# Patient Record
Sex: FEMALE | Race: WHITE | NOT HISPANIC OR LATINO | Employment: FULL TIME | URBAN - METROPOLITAN AREA
[De-identification: names, ages, dates, MRNs, and addresses within clinical notes are randomized per-mention and may not be internally consistent; named-entity substitution may affect disease eponyms.]

---

## 2023-01-25 PROBLEM — G47.33 OSA (OBSTRUCTIVE SLEEP APNEA): Status: ACTIVE | Noted: 2023-01-25

## 2023-01-25 PROBLEM — R14.0 GASSINESS: Status: ACTIVE | Noted: 2023-01-25

## 2023-01-25 PROBLEM — E03.8 SUBCLINICAL HYPOTHYROIDISM: Status: ACTIVE | Noted: 2023-01-25

## 2023-01-25 PROBLEM — K58.0 IRRITABLE BOWEL SYNDROME WITH DIARRHEA: Status: ACTIVE | Noted: 2023-01-25

## 2023-01-25 PROBLEM — J30.81 ALLERGY TO CATS: Status: ACTIVE | Noted: 2023-01-25

## 2023-01-25 PROBLEM — E55.9 VITAMIN D DEFICIENCY: Status: ACTIVE | Noted: 2023-01-25

## 2023-01-25 PROBLEM — R52 CHRONIC PAIN OF MULTIPLE SITES: Status: ACTIVE | Noted: 2023-01-25

## 2023-01-25 PROBLEM — F41.9 ANXIETY AND DEPRESSION: Status: ACTIVE | Noted: 2023-01-25

## 2023-01-25 PROBLEM — M79.7 FIBROMYALGIA: Status: ACTIVE | Noted: 2023-01-25

## 2023-01-25 PROBLEM — R41.3 MEMORY LOSS: Status: ACTIVE | Noted: 2023-01-25

## 2023-01-25 PROBLEM — F32.A ANXIETY AND DEPRESSION: Status: ACTIVE | Noted: 2023-01-25

## 2023-01-25 PROBLEM — E66.9 CLASS 1 OBESITY WITH SERIOUS COMORBIDITY AND BODY MASS INDEX (BMI) OF 34.0 TO 34.9 IN ADULT: Status: ACTIVE | Noted: 2023-01-25

## 2023-01-25 PROBLEM — R53.83 FATIGUE: Status: ACTIVE | Noted: 2023-01-25

## 2023-01-25 PROBLEM — J45.909 REACTIVE AIRWAY DISEASE (HHS-HCC): Status: ACTIVE | Noted: 2023-01-25

## 2023-01-25 PROBLEM — R09.82 POSTNASAL DRIP: Status: ACTIVE | Noted: 2023-01-25

## 2023-01-25 PROBLEM — R29.818 SUSPECTED SLEEP APNEA: Status: ACTIVE | Noted: 2023-01-25

## 2023-01-25 PROBLEM — G43.909 MIGRAINE WITHOUT STATUS MIGRAINOSUS, NOT INTRACTABLE: Status: ACTIVE | Noted: 2023-01-25

## 2023-01-25 PROBLEM — R20.2 NUMBNESS AND TINGLING IN BOTH HANDS: Status: ACTIVE | Noted: 2023-01-25

## 2023-01-25 PROBLEM — J30.9 ALLERGIC RHINITIS: Status: ACTIVE | Noted: 2023-01-25

## 2023-01-25 PROBLEM — H90.3 SENSORINEURAL HEARING LOSS (SNHL) OF BOTH EARS: Status: ACTIVE | Noted: 2023-01-25

## 2023-01-25 PROBLEM — J30.81 ALLERGY TO DOGS: Status: ACTIVE | Noted: 2023-01-25

## 2023-01-25 PROBLEM — K64.8 INTERNAL HEMORRHOIDS: Status: ACTIVE | Noted: 2023-01-25

## 2023-01-25 PROBLEM — R79.89 LOW VITAMIN B12 LEVEL: Status: ACTIVE | Noted: 2023-01-25

## 2023-01-25 PROBLEM — J32.4 CHRONIC PANSINUSITIS: Status: ACTIVE | Noted: 2023-01-25

## 2023-01-25 PROBLEM — J34.2 DEVIATED NASAL SEPTUM: Status: ACTIVE | Noted: 2023-01-25

## 2023-01-25 PROBLEM — M50.90 CERVICAL DISC DISORDER: Status: ACTIVE | Noted: 2023-01-25

## 2023-01-25 PROBLEM — H04.129 DRY EYE: Status: ACTIVE | Noted: 2023-01-25

## 2023-01-25 PROBLEM — R20.0 NUMBNESS AND TINGLING IN BOTH HANDS: Status: ACTIVE | Noted: 2023-01-25

## 2023-01-25 PROBLEM — J33.9 NASAL POLYP: Status: ACTIVE | Noted: 2023-01-25

## 2023-01-25 PROBLEM — J34.3 HYPERTROPHY OF INFERIOR NASAL TURBINATE: Status: ACTIVE | Noted: 2023-01-25

## 2023-01-25 PROBLEM — E66.811 CLASS 1 OBESITY WITH SERIOUS COMORBIDITY AND BODY MASS INDEX (BMI) OF 34.0 TO 34.9 IN ADULT: Status: ACTIVE | Noted: 2023-01-25

## 2023-01-25 PROBLEM — F31.9 BIPOLAR DISORDER WITH DEPRESSION (MULTI): Status: ACTIVE | Noted: 2023-01-25

## 2023-01-25 PROBLEM — K21.9 GASTROESOPHAGEAL REFLUX DISEASE: Status: ACTIVE | Noted: 2023-01-25

## 2023-01-25 PROBLEM — G89.29 CHRONIC PAIN OF MULTIPLE SITES: Status: ACTIVE | Noted: 2023-01-25

## 2023-01-25 RX ORDER — FAMOTIDINE 20 MG/1
1 TABLET, FILM COATED ORAL DAILY
COMMUNITY
Start: 2022-03-14

## 2023-01-25 RX ORDER — LIFITEGRAST 50 MG/ML
SOLUTION/ DROPS OPHTHALMIC
COMMUNITY
Start: 2022-09-08

## 2023-01-25 RX ORDER — MELATONIN 1 MG/ML
2400 LIQUID (ML) ORAL DAILY
COMMUNITY
Start: 2022-09-08 | End: 2023-05-02 | Stop reason: ALTCHOICE

## 2023-01-25 RX ORDER — TRIAMCINOLONE ACETONIDE 1 MG/G
CREAM TOPICAL
COMMUNITY
Start: 2022-09-08 | End: 2023-05-02 | Stop reason: ALTCHOICE

## 2023-01-25 RX ORDER — MELOXICAM 15 MG/1
TABLET ORAL
COMMUNITY
Start: 2022-06-21 | End: 2023-11-13 | Stop reason: ALTCHOICE

## 2023-01-25 RX ORDER — DROSPIRENONE AND ETHINYL ESTRADIOL 0.03MG-3MG
KIT ORAL
COMMUNITY
Start: 2022-03-08

## 2023-01-25 RX ORDER — FLUTICASONE PROPIONATE 50 MCG
2 SPRAY, SUSPENSION (ML) NASAL EVERY OTHER DAY
COMMUNITY
Start: 2022-04-13 | End: 2023-05-02 | Stop reason: ALTCHOICE

## 2023-01-25 RX ORDER — LAMOTRIGINE 100 MG/1
1 TABLET, ORALLY DISINTEGRATING ORAL DAILY
COMMUNITY
Start: 2021-04-22

## 2023-01-25 RX ORDER — LEVOTHYROXINE SODIUM 25 UG/1
1 TABLET ORAL DAILY
COMMUNITY
Start: 2022-03-21 | End: 2023-03-09 | Stop reason: SDUPTHER

## 2023-01-25 RX ORDER — ESCITALOPRAM OXALATE 5 MG/1
1 TABLET ORAL DAILY
COMMUNITY
Start: 2019-08-22 | End: 2023-05-02 | Stop reason: ALTCHOICE

## 2023-01-25 RX ORDER — CLONAZEPAM 0.5 MG/1
TABLET ORAL
COMMUNITY
Start: 2021-09-16

## 2023-01-25 RX ORDER — SIMETHICONE 80 MG
TABLET,CHEWABLE ORAL
COMMUNITY
Start: 2022-08-02 | End: 2023-11-13 | Stop reason: ALTCHOICE

## 2023-01-25 RX ORDER — DIPHENOXYLATE HYDROCHLORIDE AND ATROPINE SULFATE 2.5; .025 MG/1; MG/1
1 TABLET ORAL 3 TIMES DAILY PRN
COMMUNITY
End: 2023-11-07 | Stop reason: SDUPTHER

## 2023-01-25 RX ORDER — FLUCONAZOLE 150 MG/1
150 TABLET ORAL ONCE
COMMUNITY
End: 2023-05-02 | Stop reason: ALTCHOICE

## 2023-01-25 RX ORDER — ACETAMINOPHEN 500 MG
1-2 TABLET ORAL 2 TIMES DAILY
COMMUNITY
Start: 2022-03-14

## 2023-01-25 RX ORDER — CHOLECALCIFEROL (VITAMIN D3) 50 MCG
TABLET ORAL
COMMUNITY
Start: 2021-04-15

## 2023-01-25 RX ORDER — ELUXADOLINE 75 MG/1
1 TABLET, FILM COATED ORAL 2 TIMES DAILY
COMMUNITY
Start: 2022-10-21 | End: 2023-11-15 | Stop reason: SDUPTHER

## 2023-01-25 RX ORDER — ACETAMINOPHEN, DEXTROMETHORPHAN HBR, DOXYLAMINE SUCCINATE, PHENYLEPHRINE HCL 650; 20; 12.5; 1 MG/30ML; MG/30ML; MG/30ML; MG/30ML
SOLUTION ORAL
COMMUNITY
Start: 2022-05-23 | End: 2023-05-02 | Stop reason: ALTCHOICE

## 2023-01-25 RX ORDER — VIT C/E/ZN/COPPR/LUTEIN/ZEAXAN 250MG-90MG
CAPSULE ORAL
COMMUNITY
Start: 2022-09-08 | End: 2023-05-02 | Stop reason: ALTCHOICE

## 2023-01-25 RX ORDER — NARATRIPTAN 2.5 MG/1
TABLET ORAL
COMMUNITY
Start: 2019-08-22 | End: 2023-05-02 | Stop reason: ALTCHOICE

## 2023-03-09 DIAGNOSIS — E03.8 SUBCLINICAL HYPOTHYROIDISM: Primary | ICD-10-CM

## 2023-03-09 RX ORDER — LEVOTHYROXINE SODIUM 25 UG/1
25 TABLET ORAL
Qty: 90 TABLET | Refills: 1 | Status: SHIPPED | OUTPATIENT
Start: 2023-03-09 | End: 2023-06-05

## 2023-03-13 LAB
ALANINE AMINOTRANSFERASE (SGPT) (U/L) IN SER/PLAS: 11 U/L (ref 7–45)
ALBUMIN (G/DL) IN SER/PLAS: 4.1 G/DL (ref 3.4–5)
ALKALINE PHOSPHATASE (U/L) IN SER/PLAS: 50 U/L (ref 33–110)
ANION GAP IN SER/PLAS: 16 MMOL/L (ref 10–20)
APPEARANCE, URINE: CLEAR
ASPARTATE AMINOTRANSFERASE (SGOT) (U/L) IN SER/PLAS: 14 U/L (ref 9–39)
BASOPHILS (10*3/UL) IN BLOOD BY AUTOMATED COUNT: 0.07 X10E9/L (ref 0–0.1)
BASOPHILS/100 LEUKOCYTES IN BLOOD BY AUTOMATED COUNT: 0.8 % (ref 0–2)
BILIRUBIN TOTAL (MG/DL) IN SER/PLAS: 0.4 MG/DL (ref 0–1.2)
BILIRUBIN, URINE: NEGATIVE
BLOOD, URINE: ABNORMAL
CALCIDIOL (25 OH VITAMIN D3) (NG/ML) IN SER/PLAS: 46 NG/ML
CALCIUM (MG/DL) IN SER/PLAS: 9.4 MG/DL (ref 8.6–10.6)
CARBON DIOXIDE, TOTAL (MMOL/L) IN SER/PLAS: 24 MMOL/L (ref 21–32)
CHLORIDE (MMOL/L) IN SER/PLAS: 105 MMOL/L (ref 98–107)
CHOLESTEROL (MG/DL) IN SER/PLAS: 179 MG/DL (ref 0–199)
CHOLESTEROL IN HDL (MG/DL) IN SER/PLAS: 92.6 MG/DL
CHOLESTEROL/HDL RATIO: 1.9
COLOR, URINE: YELLOW
CREATININE (MG/DL) IN SER/PLAS: 0.63 MG/DL (ref 0.5–1.05)
EOSINOPHILS (10*3/UL) IN BLOOD BY AUTOMATED COUNT: 0.48 X10E9/L (ref 0–0.7)
EOSINOPHILS/100 LEUKOCYTES IN BLOOD BY AUTOMATED COUNT: 5.8 % (ref 0–6)
ERYTHROCYTE DISTRIBUTION WIDTH (RATIO) BY AUTOMATED COUNT: 12.7 % (ref 11.5–14.5)
ERYTHROCYTE MEAN CORPUSCULAR HEMOGLOBIN CONCENTRATION (G/DL) BY AUTOMATED: 32 G/DL (ref 32–36)
ERYTHROCYTE MEAN CORPUSCULAR VOLUME (FL) BY AUTOMATED COUNT: 100 FL (ref 80–100)
ERYTHROCYTES (10*6/UL) IN BLOOD BY AUTOMATED COUNT: 4.05 X10E12/L (ref 4–5.2)
GFR FEMALE: >90 ML/MIN/1.73M2
GLUCOSE (MG/DL) IN SER/PLAS: 92 MG/DL (ref 74–99)
GLUCOSE, URINE: NEGATIVE MG/DL
HEMATOCRIT (%) IN BLOOD BY AUTOMATED COUNT: 40.3 % (ref 36–46)
HEMOGLOBIN (G/DL) IN BLOOD: 12.9 G/DL (ref 12–16)
IMMATURE GRANULOCYTES/100 LEUKOCYTES IN BLOOD BY AUTOMATED COUNT: 0.4 % (ref 0–0.9)
KETONES, URINE: NEGATIVE MG/DL
LDL: 62 MG/DL (ref 0–99)
LEUKOCYTE ESTERASE, URINE: NEGATIVE
LEUKOCYTES (10*3/UL) IN BLOOD BY AUTOMATED COUNT: 8.3 X10E9/L (ref 4.4–11.3)
LYMPHOCYTES (10*3/UL) IN BLOOD BY AUTOMATED COUNT: 3.17 X10E9/L (ref 1.2–4.8)
LYMPHOCYTES/100 LEUKOCYTES IN BLOOD BY AUTOMATED COUNT: 38.1 % (ref 13–44)
MONOCYTES (10*3/UL) IN BLOOD BY AUTOMATED COUNT: 0.76 X10E9/L (ref 0.1–1)
MONOCYTES/100 LEUKOCYTES IN BLOOD BY AUTOMATED COUNT: 9.1 % (ref 2–10)
NEUTROPHILS (10*3/UL) IN BLOOD BY AUTOMATED COUNT: 3.82 X10E9/L (ref 1.2–7.7)
NEUTROPHILS/100 LEUKOCYTES IN BLOOD BY AUTOMATED COUNT: 45.8 % (ref 40–80)
NITRITE, URINE: NEGATIVE
NRBC (PER 100 WBCS) BY AUTOMATED COUNT: 0 /100 WBC (ref 0–0)
PH, URINE: 6 (ref 5–8)
PLATELETS (10*3/UL) IN BLOOD AUTOMATED COUNT: 309 X10E9/L (ref 150–450)
POTASSIUM (MMOL/L) IN SER/PLAS: 5.2 MMOL/L (ref 3.5–5.3)
PROTEIN TOTAL: 6.7 G/DL (ref 6.4–8.2)
PROTEIN, URINE: NEGATIVE MG/DL
RBC, URINE: 1 /HPF (ref 0–5)
SODIUM (MMOL/L) IN SER/PLAS: 140 MMOL/L (ref 136–145)
SPECIFIC GRAVITY, URINE: 1.01 (ref 1–1.03)
SQUAMOUS EPITHELIAL CELLS, URINE: 10 /HPF
THYROTROPIN (MIU/L) IN SER/PLAS BY DETECTION LIMIT <= 0.05 MIU/L: 2.98 MIU/L (ref 0.44–3.98)
THYROXINE (T4) FREE (NG/DL) IN SER/PLAS: 0.98 NG/DL (ref 0.78–1.48)
TRIGLYCERIDE (MG/DL) IN SER/PLAS: 121 MG/DL (ref 0–149)
UREA NITROGEN (MG/DL) IN SER/PLAS: 14 MG/DL (ref 6–23)
UROBILINOGEN, URINE: <2 MG/DL (ref 0–1.9)
VLDL: 24 MG/DL (ref 0–40)
WBC, URINE: <1 /HPF (ref 0–5)

## 2023-03-16 PROBLEM — R25.2 CRAMP IN LIMB: Status: ACTIVE | Noted: 2023-03-16

## 2023-03-16 PROBLEM — K58.9 IBS (IRRITABLE BOWEL SYNDROME): Status: ACTIVE | Noted: 2023-03-16

## 2023-03-16 PROBLEM — R19.7 DIARRHEA IN ADULT PATIENT: Status: ACTIVE | Noted: 2023-03-16

## 2023-03-16 PROBLEM — B37.31 CANDIDIASIS OF VAGINA: Status: ACTIVE | Noted: 2023-03-16

## 2023-03-16 PROBLEM — K52.9 CHRONIC DIARRHEA: Status: ACTIVE | Noted: 2023-03-16

## 2023-03-16 PROBLEM — R93.5 ABNORMAL CT OF THE ABDOMEN: Status: ACTIVE | Noted: 2023-03-16

## 2023-03-16 PROBLEM — M47.12 CERVICAL SPONDYLOSIS WITH MYELOPATHY: Status: ACTIVE | Noted: 2019-05-16

## 2023-03-16 PROBLEM — M50.20 HERNIATED CERVICAL INTERVERTEBRAL DISC: Status: ACTIVE | Noted: 2023-03-16

## 2023-03-16 PROBLEM — M47.819 SPONDYLOSIS WITHOUT MYELOPATHY OR RADICULOPATHY: Status: ACTIVE | Noted: 2023-03-16

## 2023-03-16 PROBLEM — R10.30 LOWER ABDOMINAL PAIN: Status: ACTIVE | Noted: 2023-03-16

## 2023-03-16 PROBLEM — Z98.890 HISTORY OF ELECTROCONVULSIVE THERAPY: Status: ACTIVE | Noted: 2017-01-01

## 2023-03-16 PROBLEM — F41.9 ANXIETY: Status: ACTIVE | Noted: 2023-03-16

## 2023-03-16 PROBLEM — N87.0 DYSPLASIA OF CERVIX, LOW GRADE (CIN 1): Status: ACTIVE | Noted: 2020-01-01

## 2023-03-16 PROBLEM — G43.001 MIGRAINE WITHOUT AURA AND WITH STATUS MIGRAINOSUS, NOT INTRACTABLE: Status: ACTIVE | Noted: 2023-03-16

## 2023-03-16 PROBLEM — G43.909 MIGRAINES: Status: ACTIVE | Noted: 2023-03-16

## 2023-03-16 PROBLEM — M47.22 OSTEOARTHRITIS OF SPINE WITH RADICULOPATHY, CERVICAL REGION: Status: ACTIVE | Noted: 2018-11-15

## 2023-03-16 PROBLEM — K50.00: Status: ACTIVE | Noted: 2023-03-16

## 2023-03-16 PROBLEM — J34.2 NASAL SEPTAL DEVIATION: Status: ACTIVE | Noted: 2023-03-16

## 2023-03-16 PROBLEM — F31.9 BIPOLAR 1 DISORDER (MULTI): Status: ACTIVE | Noted: 2023-03-16

## 2023-03-16 RX ORDER — SOD SULF/POT CHLORIDE/MAG SULF 1.479 G
TABLET ORAL
COMMUNITY
Start: 2023-03-07 | End: 2023-05-02 | Stop reason: ALTCHOICE

## 2023-03-16 RX ORDER — VILOXAZINE HYDROCHLORIDE 200 MG/1
CAPSULE, EXTENDED RELEASE ORAL
COMMUNITY
Start: 2023-01-06 | End: 2023-05-02 | Stop reason: SINTOL

## 2023-03-16 RX ORDER — GABAPENTIN 100 MG/1
100 CAPSULE ORAL NIGHTLY
COMMUNITY
Start: 2022-05-23 | End: 2023-05-02 | Stop reason: ALTCHOICE

## 2023-03-16 RX ORDER — OLANZAPINE AND SAMIDORPHAN L-MALATE 5; 10 MG/1; MG/1
TABLET, FILM COATED ORAL
COMMUNITY
End: 2023-05-02 | Stop reason: SINTOL

## 2023-03-16 RX ORDER — RIFAXIMIN 550 MG/1
1 TABLET ORAL
COMMUNITY
Start: 2022-06-07 | End: 2023-05-02 | Stop reason: ALTCHOICE

## 2023-03-16 RX ORDER — CLOBETASOL PROPIONATE 0.5 MG/G
OINTMENT TOPICAL
COMMUNITY
Start: 2023-01-05

## 2023-03-20 ENCOUNTER — APPOINTMENT (OUTPATIENT)
Dept: PRIMARY CARE | Facility: CLINIC | Age: 43
End: 2023-03-20
Payer: COMMERCIAL

## 2023-03-30 ENCOUNTER — APPOINTMENT (OUTPATIENT)
Dept: PRIMARY CARE | Facility: CLINIC | Age: 43
End: 2023-03-30
Payer: COMMERCIAL

## 2023-05-02 ENCOUNTER — OFFICE VISIT (OUTPATIENT)
Dept: PRIMARY CARE | Facility: CLINIC | Age: 43
End: 2023-05-02
Payer: COMMERCIAL

## 2023-05-02 VITALS
OXYGEN SATURATION: 96 % | WEIGHT: 197 LBS | HEIGHT: 63 IN | HEART RATE: 83 BPM | DIASTOLIC BLOOD PRESSURE: 78 MMHG | BODY MASS INDEX: 34.91 KG/M2 | SYSTOLIC BLOOD PRESSURE: 118 MMHG

## 2023-05-02 DIAGNOSIS — Z00.00 ENCOUNTER FOR ROUTINE ADULT HEALTH EXAMINATION WITHOUT ABNORMAL FINDINGS: Primary | ICD-10-CM

## 2023-05-02 DIAGNOSIS — F31.9 BIPOLAR DISORDER WITH DEPRESSION (MULTI): ICD-10-CM

## 2023-05-02 DIAGNOSIS — E66.01 SEVERE OBESITY (BMI 35.0-35.9 WITH COMORBIDITY) (MULTI): ICD-10-CM

## 2023-05-02 DIAGNOSIS — E03.8 SUBCLINICAL HYPOTHYROIDISM: ICD-10-CM

## 2023-05-02 PROBLEM — Z00.129 ENCOUNTER FOR ROUTINE CHILD HEALTH EXAMINATION WITHOUT ABNORMAL FINDINGS: Status: ACTIVE | Noted: 2023-05-02

## 2023-05-02 PROBLEM — E66.811 CLASS 1 OBESITY WITH SERIOUS COMORBIDITY AND BODY MASS INDEX (BMI) OF 34.0 TO 34.9 IN ADULT: Status: RESOLVED | Noted: 2023-01-25 | Resolved: 2023-05-02

## 2023-05-02 PROBLEM — K50.00: Status: RESOLVED | Noted: 2023-03-16 | Resolved: 2023-05-02

## 2023-05-02 PROBLEM — E66.9 CLASS 1 OBESITY WITH SERIOUS COMORBIDITY AND BODY MASS INDEX (BMI) OF 34.0 TO 34.9 IN ADULT: Status: RESOLVED | Noted: 2023-01-25 | Resolved: 2023-05-02

## 2023-05-02 PROCEDURE — 1036F TOBACCO NON-USER: CPT | Performed by: INTERNAL MEDICINE

## 2023-05-02 PROCEDURE — 3008F BODY MASS INDEX DOCD: CPT | Performed by: INTERNAL MEDICINE

## 2023-05-02 PROCEDURE — 99396 PREV VISIT EST AGE 40-64: CPT | Performed by: INTERNAL MEDICINE

## 2023-05-02 ASSESSMENT — ENCOUNTER SYMPTOMS
CONSTITUTIONAL NEGATIVE: 1
NEUROLOGICAL NEGATIVE: 1
DYSPHORIC MOOD: 0
ENDOCRINE NEGATIVE: 1
ARTHRALGIAS: 1
RESPIRATORY NEGATIVE: 1
GASTROINTESTINAL NEGATIVE: 1
EYES NEGATIVE: 1
NECK STIFFNESS: 1
MYALGIAS: 1
ALLERGIC/IMMUNOLOGIC NEGATIVE: 1
HEMATOLOGIC/LYMPHATIC NEGATIVE: 1
NERVOUS/ANXIOUS: 0
CARDIOVASCULAR NEGATIVE: 1
NECK PAIN: 1
SLEEP DISTURBANCE: 1

## 2023-05-02 NOTE — PROGRESS NOTES
"Subjective   Patient ID: Michell Francis is a 42 y.o. female who presents for Follow-up physical.    Meds changed with psych.  Now on lamictal and klonopin.  On viberzi for IBS-D with GI.  Not taking much lomotil now.  Taking mobic daily for pain.  Pain everywhere, neck, shoulders, arms and legs.  4 previous c-spine surgeries.  C3-T1 now fused.  PM in past, [facility].  Has medical marijuana card.  PM opined fibromyalgia in past.  Tried CPAP for mild ELIGIO and sleep.  Takes klonopin at bedtime.  Not following diet.  No HEP.  Weight up since last ov.  Meds and labs reviewed.       Review of Systems   Constitutional: Negative.    HENT: Negative.     Eyes: Negative.    Respiratory: Negative.     Cardiovascular: Negative.    Gastrointestinal: Negative.    Endocrine: Negative.    Genitourinary: Negative.    Musculoskeletal:  Positive for arthralgias, myalgias, neck pain and neck stiffness.   Allergic/Immunologic: Negative.    Neurological: Negative.    Hematological: Negative.    Psychiatric/Behavioral:  Positive for sleep disturbance. Negative for dysphoric mood and suicidal ideas. The patient is not nervous/anxious.        Objective   /78 (BP Location: Right arm, Patient Position: Sitting)   Pulse 83   Ht 1.588 m (5' 2.5\")   Wt 89.4 kg (197 lb)   SpO2 96%   BMI 35.46 kg/m²     Physical Exam  Vitals and nursing note reviewed.   Constitutional:       Appearance: Normal appearance.   HENT:      Head: Normocephalic and atraumatic.      Right Ear: Tympanic membrane normal.      Left Ear: Tympanic membrane normal.      Nose: Nose normal.      Mouth/Throat:      Pharynx: Oropharynx is clear.   Eyes:      Extraocular Movements: Extraocular movements intact.      Conjunctiva/sclera: Conjunctivae normal.      Pupils: Pupils are equal, round, and reactive to light.   Cardiovascular:      Rate and Rhythm: Normal rate and regular rhythm.      Pulses: Normal pulses.      Heart sounds: Normal heart sounds. "   Pulmonary:      Effort: Pulmonary effort is normal.      Breath sounds: Normal breath sounds.   Abdominal:      General: Bowel sounds are normal.      Palpations: Abdomen is soft.   Musculoskeletal:         General: Normal range of motion.      Cervical back: Normal range of motion and neck supple.   Skin:     General: Skin is warm and dry.   Neurological:      General: No focal deficit present.      Mental Status: She is alert and oriented to person, place, and time. Mental status is at baseline.   Psychiatric:         Mood and Affect: Mood normal.         Behavior: Behavior normal.         Thought Content: Thought content normal.         Judgment: Judgment normal.       Assessment/Plan     Adult Health Maintenance   Labs reviewed.  Recommend diet, exercise and weight management.   FU ov in 6 months.    Problem List Items Addressed This Visit          Endocrine/Metabolic    Severe obesity (BMI 35.0-35.9 with comorbidity) (CMS/HCC)     Recommend diet, exercise and weight management.             Other    Bipolar disorder with depression (CMS/HCC)     Mena, psych following.         Encounter for routine adult health examination without abnormal findings - Primary

## 2023-05-25 ENCOUNTER — NUTRITION (OUTPATIENT)
Dept: PRIMARY CARE | Facility: CLINIC | Age: 43
End: 2023-05-25
Payer: COMMERCIAL

## 2023-05-25 DIAGNOSIS — Z71.3 DIETARY COUNSELING: Primary | ICD-10-CM

## 2023-05-26 NOTE — PROGRESS NOTES
Assessment     Reason for Visit:  Michell Francis is a 42 y.o. female who presents for Nutrition Counseling (DE hx, general health-promoting MNT)    Anthropometrics:            Food And Nutrient Intake:  Food and Nutrient History  Food and Nutrient History: Pt is looking to resume work together. Specifically goals around exercise/movement, menu planning, emotional eating and her overall relationship with food. Has ED hx from 2014 where she was in [facility] for eating disorder. 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 Anxiety plays a large part in movement. Tries to drink water in the day to avoid eating. Feels “starving all the time” some days - specifically days that she restricts. Sees a therapist monthly - does not discuss food/body image there.  Energy Intake: Fair 50-75 %     Food Intake  Meal 1: B: yogurt, belvita or 3 eggs with cheese and toast - eggs will keep her rodas than singular item meals  Meal 2: L: turkey and carlos with 1 slice of bread or pb&J or mac and cheese with hot dogs  Meal 3: D: salad with chicken, chicken with rice and corn, carry out most nights  Snacks: Avoids snacking but might find herself over-eating at night. Tries to avoid buying certain foods for fear of over-eating them.    Food Preparation  Cooking:  (limited cooking at home per pt)  Grocery Shopping: Patient, Spouse/Significant Other                                                   Food And Nutrient Administration:                        Factors:                         Physical Activities:              Knowledge Beliefs Attitudes and Behavior       Beliefs and Attitudes  Beliefs and Attitudes: Preoccupation with food, Self-talk/cognitions, Distorted body image, Preoccupation with weight, Motivation (SOC: contemplative; Body dissatisfaction independent of body size. Believes she might self-harm in the form of binge eating to pain.)                               Nutrition Focused Physical  Exam:           Energy Needs           Diagnosis      Nutrition Diagnosis  Patient has Nutrition Diagnosis: Yes  Nutrition Diagnosis 1: Disordered eating pattern  Related to (1): ED hx, restriction, diet mentality, nutrition misinformation  As Evidenced by (1): typical recall, DE thoughts    Interventions/Recommendations   Nutrition Counseling  Strategies: Nutrition counseling based on motivational interviewing strategy  Goals: Discussed her DE habits and behaviors, hx of dieting, hx of ED, body image, current eating patterns and thoughts. We discussed her goals together: increasing exercise, eating more adequate and balanced meals, feeling comfortable around all foods, navigating emotional eating. Wants to try exercising for 5-10 minutes a few times daily to see how this feels with her fibromyalgia vs 15-20 min at a time. Wants to discuss menu planning at next session.        There are no Patient Instructions on file for this visit.    Monitoring and Evaluation   Food/Nutrient Related History Monitoring  Monitoring and Evaluation Plan: Meal/snack pattern  Knowledge/Beliefs/Attitudes  Monitoring and Evaluation Plan: Beliefs and attitudes, Physical activity        Time Spent  Prep time on day of patient encounter: 5 minutes  Time spent directly with patient, family or caregiver: 50 minutes  Additional Time Spent on Patient Care Activities: 0 minutes  Documentation Time: 10 minutes  Other Time Spent: 0 minutes  Total: 65 minutes

## 2023-06-04 DIAGNOSIS — E03.8 SUBCLINICAL HYPOTHYROIDISM: ICD-10-CM

## 2023-06-05 RX ORDER — LEVOTHYROXINE SODIUM 25 UG/1
TABLET ORAL
Qty: 90 TABLET | Refills: 1 | Status: SHIPPED | OUTPATIENT
Start: 2023-06-05 | End: 2023-11-13 | Stop reason: ALTCHOICE

## 2023-06-08 ENCOUNTER — NUTRITION (OUTPATIENT)
Dept: PRIMARY CARE | Facility: CLINIC | Age: 43
End: 2023-06-08
Payer: COMMERCIAL

## 2023-06-08 DIAGNOSIS — Z71.3 DIETARY COUNSELING: Primary | ICD-10-CM

## 2023-06-08 NOTE — PROGRESS NOTES
"Assessment     Reason for Visit:  Michell Francis is a 42 y.o. female who presents for Nutrition Counseling (DE, negative BI, HAES)    Anthropometrics:            Food And Nutrient Intake:  Food and Nutrient History  Food and Nutrient History: Pt is struggling with RAVES, meal planning. Worries that she eats too much at night, worries about \"emotional eating.\" She recently had a difficult time being in pictures 2/2 negative BI. Feels need to lose weight in order to be comfortable in social settings. Realizes negative BI is preventing her from participating in life fully.     Food Intake  Meal 1: yogurt or eggs and toast  Meal 2: 1/2 sandwich  Meal 3: pizza, perogies                                                        Food And Nutrient Administration:                        Factors:                         Physical Activities:              Knowledge Beliefs Attitudes and Behavior       Beliefs and Attitudes  Beliefs and Attitudes: Preoccupation with food, Self-talk/cognitions, Emotions, Distorted body image, Preoccupation with weight, Unscientific beliefs/attitudes, Readiness to change nutrition-related behaviors (SOC: contemplative)         Avoidance Behavior  Restrictive Eating: Yes  Cause of Avoidance Behavior: ED hx, diet culture         Mealtime Behavior  Rumination: Yes (less than in past)           Nutrition Focused Physical Exam:           Energy Needs           Diagnosis      Nutrition Diagnosis  Patient has Nutrition Diagnosis: Yes  Nutrition Diagnosis 1: Inadequate energy intake  Related to (1): diet mentality, ED  As Evidenced by (1): avoiding eating in the day    Interventions/Recommendations   Nutrition Education  Nutrition Education Content: Content related nutrition education  Goals: Exchage system, 70% of metablic needs by afternoon, neuropeptide-Y and why eating in RAVES framework is important foundation to this work. Explored menu planning options - leaning towards more structured exchange like " system  Nutrition Counseling  Strategies: Nutrition counseling based on motivational interviewing strategy  Goals: Explored negative BI, how BI is holding her back from experiencing life, asking if these thoughts are facts or ED. Discussed eating more adequately in the day to prevent ravenous evening hunger. Explored how she used to eat 3 meals and 3 snacks in treatment - noting how much more comfortable she felt with food and energy at the time. To follow up on menu planning structure during next session. Feeling more gracious with self for eating at night and exploring idea that body isn't the most important thing about her, but HAES and body respect might be.        There are no Patient Instructions on file for this visit.    Monitoring and Evaluation   Food/Nutrient Related History Monitoring  Monitoring and Evaluation Plan: Meal/snack pattern, Amount of food  Knowledge/Beliefs/Attitudes  Monitoring and Evaluation Plan: Beliefs and attitudes, Avoidance behavior        Time Spent  Prep time on day of patient encounter: 5 minutes  Time spent directly with patient, family or caregiver: 60 minutes  Additional Time Spent on Patient Care Activities: 0 minutes  Documentation Time: 10 minutes  Other Time Spent: 0 minutes  Total: 75 minutes

## 2023-06-15 ENCOUNTER — NUTRITION (OUTPATIENT)
Dept: PRIMARY CARE | Facility: CLINIC | Age: 43
End: 2023-06-15
Payer: COMMERCIAL

## 2023-06-15 DIAGNOSIS — Z71.3 DIETARY COUNSELING: Primary | ICD-10-CM

## 2023-06-15 NOTE — PROGRESS NOTES
"Assessment     Reason for Visit:  Michell Francis is a 42 y.o. female who presents for Nutrition Counseling (Healthy behavior changes, hx of DE)    Anthropometrics:            Food And Nutrient Intake:  Food and Nutrient History  Food and Nutrient History: Pt found previous conversation helpful in setting of RAVES - agrees that eating more adequately and balanced in the day helps keep her rodas and reduce ravenous hunger/cravings at night. She would like to discuss more detailed meal planning today. Questions about \"portions, amounts, good/bad\" foods - wants a list of foods to eat daily, how much and how often.  Energy Intake: Fair 50-75 %     Food Intake  Amount of Food: granola, bread, ekta chips, oatmeal, rice, mac and cheese, waffles, ekta bread, yogurt, cottage cheese, string cheese, hummus, eggs, turkey, chicken, burgers, hot dogs occasionally, apples, carrots, strawberries, grapes, lettuce, onion, jeny, butter, oil, dressing, cream cheese, carlos, nuts, pb, pizza, perogies  Food Variety: Present                                                        Food And Nutrient Administration:                        Factors:                         Physical Activities:              Knowledge Beliefs Attitudes and Behavior       Beliefs and Attitudes  Beliefs and Attitudes: Preoccupation with food, Self-talk/cognitions, Preoccupation with weight, Readiness to change nutrition-related behaviors, Unscientific beliefs/attitudes (SOC: contemplative)         Avoidance Behavior  Restrictive Eating: Yes (thinks about restriction, not as actively restricting following last session)         Mealtime Behavior  Rumination: Yes           Nutrition Focused Physical Exam:           Energy Needs           Diagnosis      Nutrition Diagnosis  Patient has Nutrition Diagnosis: Yes  Nutrition Diagnosis 1: Disordered eating pattern  Related to (1): fear of weight gain, black/white thinking around food rules  As Evidenced by (1): restricting, " "unsure of \"how much is the right amount,\" looking for specific amount answers in session today    Interventions/Recommendations   Nutrition Education  Nutrition Education Content: Content related nutrition education  Goals: Education on exchage system (estimated) - explored nuances of kcal needs, energy needs change daily, inviting in nuance to the idea that there is a set amount, portion for all foods. Discussed rough estimates of serving sizes for the food she normally eats and eats often. Discussed meal ideas and snack ideas. RAVES and plate method as base of this work.  Nutrition Counseling  Strategies: Nutrition counseling based on motivational interviewing strategy  Goals: Explored how negative BI prevents her from walking at times, noting her desire to walk but fear of what people may think of her body. Discussed menu planning and keeping diet culture out of this work - diet doesn't start on monday and she can't fail - used MI to explore behavior change model.        There are no Patient Instructions on file for this visit.    Monitoring and Evaluation   Food/Nutrient Related History Monitoring  Monitoring and Evaluation Plan: Amount of food, Meal/snack pattern  Knowledge/Beliefs/Attitudes  Monitoring and Evaluation Plan: Food and nutrition knowledge, Beliefs and attitudes, Avoidance behavior, Physical activity        Time Spent  Prep time on day of patient encounter: 5 minutes  Time spent directly with patient, family or caregiver: 60 minutes  Additional Time Spent on Patient Care Activities: 0 minutes  Documentation Time: 10 minutes  Other Time Spent: 0 minutes  Total: 75 minutes                          "

## 2023-06-16 LAB
C. DIFFICILE TOXIN, PCR: NORMAL
C. DIFFICILE TOXIN, PCR: NOT DETECTED
CAMPYLOBACTER GP: NOT DETECTED
CLOSTRIDIUM DIFFICILE NAP 1 STRAIN (PRESUMPTIVE): NORMAL
NOROVIRUS GI/GII: NOT DETECTED
ROTAVIRUS A: NOT DETECTED
SALMONELLA SP.: NOT DETECTED
SHIGA TOXIN 1: NOT DETECTED
SHIGA TOXIN 2: NOT DETECTED
SHIGELLA SP.: NOT DETECTED
VIBRIO GRP.: NOT DETECTED
YERSINIA ENTEROCOLITICA: NOT DETECTED

## 2023-06-22 ENCOUNTER — APPOINTMENT (OUTPATIENT)
Dept: PRIMARY CARE | Facility: CLINIC | Age: 43
End: 2023-06-22
Payer: COMMERCIAL

## 2023-06-22 LAB — CALPROTECTIN, STOOL: 19 UG/G

## 2023-06-26 ENCOUNTER — APPOINTMENT (OUTPATIENT)
Dept: PRIMARY CARE | Facility: CLINIC | Age: 43
End: 2023-06-26
Payer: COMMERCIAL

## 2023-06-27 LAB
CRYPTOSPORIDIUM ANTIGEN-DATA CONVERSION: NEGATIVE
GIARDIA LAMBLIA AG-DATA CONVERSION: NEGATIVE
OVA + PARASITE EXAM: NEGATIVE

## 2023-06-29 ENCOUNTER — APPOINTMENT (OUTPATIENT)
Dept: PRIMARY CARE | Facility: CLINIC | Age: 43
End: 2023-06-29
Payer: COMMERCIAL

## 2023-06-29 ENCOUNTER — NUTRITION (OUTPATIENT)
Dept: PRIMARY CARE | Facility: CLINIC | Age: 43
End: 2023-06-29
Payer: COMMERCIAL

## 2023-06-29 DIAGNOSIS — Z71.3 DIETARY COUNSELING: Primary | ICD-10-CM

## 2023-06-29 NOTE — PROGRESS NOTES
"Assessment     Reason for Visit:  Michell Francis is a 42 y.o. female who presents for Nutrition Counseling (Healthy habit behavior MNT, hx of DE, chronic dieting)    Anthropometrics:            Food And Nutrient Intake:  Food and Nutrient History  Food and Nutrient History: Pt was doing well with plate visual after last session but continues to feel like she wants something more structured. Having black and white rules/lists around food help her \"feel in control and proud for following the rules.\" She has used [employer], [facility] and nutrisystem in the past. Acknowledges that guilt of getting \"off track\" is difficult 2/2 black and white thinking. Has not stayed on these programs in the past but feels like this time will be more behavior change approach. Has not been sleeping well lately 2/2 fibromylagia pain. Has anniversary and trips planned later this year that she wants to lose weight for. Weighing herself q 2-3 days now.  Energy Intake: Good > 75 %     Food Intake  Meal 1: yogurt, fruit and granola or eggs and toast  Meal 2: grilled cheese, turkey sandwich, pb&j with side of fruit and cottage cheese  Meal 3: thin crust frozen pizza, mac and cheese, rice and chicken, pasta, salad, cooked vegetable, veggies and dip                                                        Food And Nutrient Administration:                        Factors:                         Physical Activities:              Knowledge Beliefs Attitudes and Behavior       Beliefs and Attitudes  Beliefs and Attitudes: Preoccupation with food, Self-talk/cognitions, Preoccupation with weight, Unrealistic nutrition-related goals, Unscientific beliefs/attitudes, Readiness to change nutrition-related behaviors (SOC: contemplative in dieting goals, SOC: pre-contemplative in DE thoughts/behaviors)                               Nutrition Focused Physical Exam:           Energy Needs           Diagnosis      Nutrition Diagnosis  Patient has Nutrition Diagnosis: " Yes  Nutrition Diagnosis 1: Disordered eating pattern  Related to (1): dieting hx, DE hx  As Evidenced by (1): desire to track intake, follow rules, restrict kcals    Interventions/Recommendations   Nutrition Education  Nutrition Education Content: Content related nutrition education  Goals: Education on food scarcity mindset, research on food insecure populations and hoarding/binge eating rates.  Nutrition Counseling  Strategies: Nutrition counseling based on motivational interviewing strategy, Nutrition counseling based on goal setting strategy  Goals: Explored her motivation to track calories again - does not feel that this will trigger DE thoughts or behaviors per ct. Plans to have 0214-0027 kcals daily. Discussed estimated BMR of 1500 and avoiding going under than number. Discussed short term weight loss and evidence behind maintaining habits. Discussed weighing herself less often - exploring that independent of her weight, she plans to increase exercise, eat balanced meals, increase fruit/veg intake.        There are no Patient Instructions on file for this visit.    Monitoring and Evaluation   Food/Nutrient Related History Monitoring  Monitoring and Evaluation Plan: Meal/snack pattern  Knowledge/Beliefs/Attitudes  Monitoring and Evaluation Plan: Food and nutrition knowledge, Beliefs and attitudes, Avoidance behavior, Physical activity        Time Spent  Prep time on day of patient encounter: 5 minutes  Time spent directly with patient, family or caregiver: 45 minutes  Additional Time Spent on Patient Care Activities: 0 minutes  Documentation Time: 10 minutes  Other Time Spent: 0 minutes  Total: 60 minutes

## 2023-07-06 ENCOUNTER — APPOINTMENT (OUTPATIENT)
Dept: PRIMARY CARE | Facility: CLINIC | Age: 43
End: 2023-07-06
Payer: COMMERCIAL

## 2023-07-13 ENCOUNTER — APPOINTMENT (OUTPATIENT)
Dept: PRIMARY CARE | Facility: CLINIC | Age: 43
End: 2023-07-13
Payer: COMMERCIAL

## 2023-07-20 ENCOUNTER — APPOINTMENT (OUTPATIENT)
Dept: PRIMARY CARE | Facility: CLINIC | Age: 43
End: 2023-07-20
Payer: COMMERCIAL

## 2023-07-20 ENCOUNTER — NUTRITION (OUTPATIENT)
Dept: PRIMARY CARE | Facility: CLINIC | Age: 43
End: 2023-07-20
Payer: COMMERCIAL

## 2023-07-20 DIAGNOSIS — Z71.3 DIETARY COUNSELING: Primary | ICD-10-CM

## 2023-07-20 NOTE — PROGRESS NOTES
"Assessment     Reason for Visit:  Michell Francis is a 42 y.o. female who presents for Nutrition Counseling (General healthy eating MNT)    Anthropometrics:            Food And Nutrient Intake:  Food and Nutrient History  Food and Nutrient History: Pt is tracking kcals daily for about 1700. Feeling good with having more intentional awareness of eating choices. Noticing eating more adequately in the day helps reduce evening hunger. Has questions about \"best bread, chip, ice cream and how or order out in the right way.\" Started walking more and enjoying this. Noticing a benefit in her mental health.  Energy Intake: Good > 75 %                                                              Food And Nutrient Administration:                        Factors:                         Physical Activities:              Knowledge Beliefs Attitudes and Behavior       Beliefs and Attitudes  Beliefs and Attitudes: Preoccupation with food, Self-talk/cognitions, Preoccupation with weight, Unscientific beliefs/attitudes                               Nutrition Focused Physical Exam:           Energy Needs           Diagnosis      Nutrition Diagnosis  Patient has Nutrition Diagnosis: Yes  Nutrition Diagnosis 1: Food and nutrition related knowledge deficit  Related to (1): nutrition misinformation, diet culture  As Evidenced by (1): looking for \"right and wrong\" food choices    Interventions/Recommendations   Nutrition Education  Nutrition Education Content: Content related nutrition education  Goals: Education on reading labels, choosing high fiber and lower sugar breads, chips, enjoying ice cream, using plate visual when out to eat, factors that impact body weight on a scale, avoiding the \"off track\" thinking.  Nutrition Counseling  Strategies: Nutrition counseling based on motivational interviewing strategy  Goals: Explored kcal tracking, how it's going for her, benefits, challenges, support for eating out, support for navigating any DE " thoughts. Explored feeling better with eating more balanced and adequate meals, moving more. Support for specific meal and snack ideas provided.        There are no Patient Instructions on file for this visit.    Monitoring and Evaluation   Food/Nutrient Related History Monitoring  Monitoring and Evaluation Plan: Meal/snack pattern  Knowledge/Beliefs/Attitudes  Monitoring and Evaluation Plan: Food and nutrition knowledge, Beliefs and attitudes, Behavior adherence, Physical activity        Time Spent  Prep time on day of patient encounter: 5 minutes  Time spent directly with patient, family or caregiver: 55 minutes  Additional Time Spent on Patient Care Activities: 0 minutes  Documentation Time: 10 minutes  Other Time Spent: 0 minutes  Total: 70 minutes

## 2023-07-27 ENCOUNTER — APPOINTMENT (OUTPATIENT)
Dept: PRIMARY CARE | Facility: CLINIC | Age: 43
End: 2023-07-27
Payer: COMMERCIAL

## 2023-08-03 ENCOUNTER — APPOINTMENT (OUTPATIENT)
Dept: PRIMARY CARE | Facility: CLINIC | Age: 43
End: 2023-08-03
Payer: COMMERCIAL

## 2023-08-10 ENCOUNTER — APPOINTMENT (OUTPATIENT)
Dept: PRIMARY CARE | Facility: CLINIC | Age: 43
End: 2023-08-10
Payer: COMMERCIAL

## 2023-08-17 ENCOUNTER — NUTRITION (OUTPATIENT)
Dept: PRIMARY CARE | Facility: CLINIC | Age: 43
End: 2023-08-17
Payer: COMMERCIAL

## 2023-08-17 DIAGNOSIS — Z71.3 DIETARY COUNSELING: Primary | ICD-10-CM

## 2023-08-17 NOTE — PROGRESS NOTES
"Assessment     Reason for Visit:  Michell Francis is a 42 y.o. female who presents for Nutrition Counseling (Healthy eating/lifestyle MNT, hx of DE, negative BI)    Anthropometrics:            Food And Nutrient Intake:  Food and Nutrient History  Food and Nutrient History: Pt is doing well with goals for eating adequate and balanced meals and snacks. Making time for these during her day, finding she has more energy and less ravenous hunger at meal times. Less evening snacking 2/2 being full and satisfied earlier in the day. Feels frustrated that she is eating wrong when she goes out to dinner and blames this as reason for not seeing weight loss. DE thoughts around wanting to know exact calories in restaurant food. Avoiding social settings 2/2 poor body image - idea that body need to \"get ready\" for events. ED thinking showing up here as \"not good enough.\" She has started walking more and is proud of this achievement also.                                                              Food And Nutrient Administration:                        Factors:                         Physical Activities:              Knowledge Beliefs Attitudes and Behavior                                       Nutrition Focused Physical Exam:           Energy Needs           Diagnosis      Nutrition Diagnosis  Patient has Nutrition Diagnosis: Yes  Nutrition Diagnosis 1: Food and nutrition related knowledge deficit  Related to (1): dieting culture, thin ideal, weight bias and antif-fat bias  As Evidenced by (1): belief that she is doing something wrong and that's why she isn't losing weight    Interventions/Recommendations   Nutrition Education  Nutrition Education Content: Content related nutrition education  Goals: Discussed multiple factors that contribute to body weight and body weight changes. Explored how dieting increases set point weight, that reducing calories and exercising more doesn't guarentee weight loss for every person. Explored her " "progress in her goals, how it's hard for her to see this because she's measuring progress on weight loss only.  Nutrition Counseling  Strategies: Nutrition counseling based on motivational interviewing strategy, Nutrition counseling based on goal setting strategy  Goals: Explored beliefs about her body and unpacked the many moments in her life that her body was commented on or she commented on others bodies. Discussed how this is unhelpful and contributing to her awareness and fears of \"what is everyone thinking about my body.\" Discussed the idea of body acceptance - how accepting her body is holding her back from living and noticing the ways she is taking care of her health. Explored how ED mind is showing up here. Plans to continue her habit changes, noticing fullness and satisfaction when out to eat, consider letting go of the thin ideal and focusing on taking care of her body rather than \"fixing\" it by making it persistently smaller.        There are no Patient Instructions on file for this visit.    Monitoring and Evaluation   Food/Nutrient Related History Monitoring  Monitoring and Evaluation Plan: Meal/snack pattern  Knowledge/Beliefs/Attitudes  Monitoring and Evaluation Plan: Food and nutrition knowledge, Beliefs and attitudes, Avoidance behavior, Behavior adherence, Physical activity        Time Spent  Prep time on day of patient encounter: 5 minutes  Time spent directly with patient, family or caregiver: 55 minutes  Additional Time Spent on Patient Care Activities: 10 minutes                          "

## 2023-08-31 ENCOUNTER — NUTRITION (OUTPATIENT)
Dept: PRIMARY CARE | Facility: CLINIC | Age: 43
End: 2023-08-31
Payer: COMMERCIAL

## 2023-08-31 DIAGNOSIS — Z71.3 DIETARY COUNSELING: Primary | ICD-10-CM

## 2023-08-31 NOTE — PROGRESS NOTES
Assessment     Reason for Visit:  Michell Francis is a 42 y.o. female who presents for Nutrition Counseling (DE hx, IE, non-diet MNT)    Anthropometrics:            Food And Nutrient Intake:  Food and Nutrient History  Food and Nutrient History: Ct is focusing on feeling her fullness - realizing how difficult this has been over her life for a variety of reasons, but how helpful it is feeling. Noticing that being more aware of how hungry she is, what sounds good, how full she feels is helpful and not restrictive feeling. Counting calories daily on ramon was starting to feel exhausting and overwhelming.  Energy Intake: Good > 75 %                                                              Food And Nutrient Administration:                        Factors:                         Physical Activities:              Knowledge Beliefs Attitudes and Behavior                                       Nutrition Focused Physical Exam:           Energy Needs           Diagnosis      Nutrition Diagnosis  Patient has Nutrition Diagnosis: No    Interventions/Recommendations   Nutrition Counseling  Strategies: Nutrition counseling based on motivational interviewing strategy  Goals: Explored benefit of listening and honoring fullness, why this is difficult, how she notices younger nieces/nephews doing this. Explored more mindful and aware eating practices: am I hungry, what sounds good, how full do I feel - discussed waste not eating, distracted eating, habit eating - naming and noticing when this happens to her, exploring how to listen to her internal cues for hunger, satisfaction and fullness instead. Plans to take this on vacation with her next week.        There are no Patient Instructions on file for this visit.    Monitoring and Evaluation   Food/Nutrient Related History Monitoring  Monitoring and Evaluation Plan: Meal/snack pattern  Knowledge/Beliefs/Attitudes  Monitoring and Evaluation Plan: Food and nutrition knowledge, Beliefs and  attitudes, Behavior adherence, Physical activity

## 2023-09-14 ENCOUNTER — TELEPHONE (OUTPATIENT)
Dept: PRIMARY CARE | Facility: CLINIC | Age: 43
End: 2023-09-14
Payer: COMMERCIAL

## 2023-09-14 NOTE — TELEPHONE ENCOUNTER
Patient called and said that she hasn't felt well past couple days she has CHEST PAIN , STOMACH ISSUES,NAUSEA ,DIARRHEA, FEVER 100.4,HEADACHE , INCREASED HEAR RATE, COUGH PT WOULD LIKE TO BE ADVISED, TESTED NEG FOR COVID 9/14

## 2023-09-20 ENCOUNTER — LAB (OUTPATIENT)
Dept: LAB | Facility: LAB | Age: 43
End: 2023-09-20
Payer: COMMERCIAL

## 2023-09-21 ENCOUNTER — NUTRITION (OUTPATIENT)
Dept: PRIMARY CARE | Facility: CLINIC | Age: 43
End: 2023-09-21
Payer: COMMERCIAL

## 2023-09-21 DIAGNOSIS — Z71.3 DIETARY COUNSELING: Primary | ICD-10-CM

## 2023-09-21 LAB
ALLERGEN ANIMAL: CAT DANDER IGE (KU/L): 1.9 KU/L
ALLERGEN ANIMAL: DOG DANDER IGE (KU/L): 0.25 KU/L
ALLERGEN GRASS: BERMUDA GRASS (CYNODON DACTYLON) IGE (KU/L): <0.1 KU/L
ALLERGEN GRASS: JOHNSON GRASS (SORGHUM HALEPENSE) IGE (KU/L): <0.1 KU/L
ALLERGEN GRASS: MEADOW GRASS, KENTUCKY BLUE (POA PRATENSIS )IGE (KU/L): <0.1 KU/L
ALLERGEN GRASS: TIMOTHY GRASS (PHLEUM PRATENSE) IGE (KU/L): <0.1 KU/L
ALLERGEN INSECT: COCKROACH IGE: <0.1 KU/L
ALLERGEN MICROORGANISM: ALTERNARIA ALTERNATA IGE (KU/L): <0.1 KU/L
ALLERGEN MICROORGANISM: ASPERGILLUS FUMIGATUS IGE (KU/L): <0.1 KU/L
ALLERGEN MICROORGANISM: CLADOSPORIUM HERBARUM IGE (KU/L): <0.1 KU/L
ALLERGEN MICROORGANISM: PENICILLIUM CHRYSOGENUM (P. NOTATUM) IGE (KU/L): <0.1 KU/L
ALLERGEN MITE: DERMATOPHAGOIDES FARINAE (HOUSE DUST MITE) IGE (KU/L): <0.1 KU/L
ALLERGEN MITE: DERMATOPHAGOIDES PTERONYSSINUS (HOUSE DUST MITE) IGE (KU/L): <0.1 KU/L
ALLERGEN TREE: BOX-ELDER (ACER NEGUNDO) IGE (KU/L): <0.1 KU/L
ALLERGEN TREE: COMMON SILVER BIRCH (BETULA VERRUCOSA) IGE (KU/L): <0.1 KU/L
ALLERGEN TREE: COTTONWOOD (POPULUS DELTOIDES) IGE (KU/L): 0.19 KU/L
ALLERGEN TREE: ELM (ULMUS AMERICANA) IGE (KU/L): 0.14 KU/L
ALLERGEN TREE: MAPLE LEAF SYCAMORE, LONDON PLANE IGE (KU/L): <0.1 KU/L
ALLERGEN TREE: MOUNTAIN JUNIPER (JUNIPERUS SABINOIDES) IGE (KU/L): <0.1 KU/L
ALLERGEN TREE: MULBERRY (MORUS ALBA) IGE (KU/L): <0.1 KU/L
ALLERGEN TREE: OAK (QUERCUS ALBA) IGE (KU/L): <0.1 KU/L
ALLERGEN TREE: PECAN, HICKORY (CARYA PECAN) IGE (KU/L): <0.1 KU/L
ALLERGEN TREE: WALNUT IGE: 0.15 KU/L
ALLERGEN TREE: WHITE ASH (FRAXINUS AMERICANA) IGE (KU/L): <0.1 KU/L
ALLERGEN WEED: COMMON PIGWEED (AMARANTHUS RETROFLEXUS) IGE (KU/L): 0.17 KU/L
ALLERGEN WEED: COMMON RAGWEED (AMB. ARTEMISIIFOLIA/A. ELATIOR) IGE (KU/L): <0.1 KU/L
ALLERGEN WEED: GOOSEFOOT, LAMB'S QUARTERS (CHENOPODIUM ALBUM) IGE (KU/L): <0.1 KU/L
ALLERGEN WEED: PLANTAIN (ENGLISH), RIBWORT (PLANTAGO LANCEOLATA) IGE (KU/L): <0.1 KU/L
ALLERGEN WEED: PRICKLY SALTWORT/RUSSIAN THISTLE (SALSOLA KALI) IGE (KU/L): <0.1 KU/L
ALLERGEN WEED: SHEEP SORREL (RUMEX ACETOSELLA) IGE (KU/L): <0.1 KU/L
IMMUNOCAP IGE: 75.4 KU/L (ref 0–214)
IMMUNOCAP INTERPRETATION: ABNORMAL

## 2023-09-21 NOTE — PROGRESS NOTES
"Assessment     Reason for Visit:  Michell Francis is a 42 y.o. female who presents for Nutrition Counseling (DE hx, non-diet behavior change, BI)    Anthropometrics:            Food And Nutrient Intake:  Food and Nutrient History  Food and Nutrient History: Pt is struggling with negative self-talk, impacting BI and eating behaviors. Reflected on how other's comments about her body impact her own body image. Struggling to listen to her body cues like she was doing before vacation. Notices herself trying to restrict, although subtle. She worries about doctor's appointments that focus on weight or label body in BMI categories. Thinking about GLP-1 for weight loss 2/2 allergist discussing with her, but also aware of the contraindication, lack of long-term research and desire to not be on a medication for the rest of her life.                                                              Food And Nutrient Administration:                        Factors:                         Physical Activities:              Knowledge Beliefs Attitudes and Behavior                                       Nutrition Focused Physical Exam:           Energy Needs           Diagnosis      Nutrition Diagnosis  Patient has Nutrition Diagnosis: Yes  Nutrition Diagnosis 1: Disordered eating pattern  Related to (1): ED hx, current DE thoughts/behaviors, diet culture, culture's weight bias  As Evidenced by (1): thoughts of restriction, over-eating, eating to numb or self-sabotage, eating to \"get back\" at diet culture    Interventions/Recommendations   Nutrition Counseling  Strategies: Nutrition counseling based on motivational interviewing strategy  Goals: Explored pts thoughts around weight loss medications. Discussed how mood and other's comments on her body impact her BI, therefore impact her eating. Explored all/nothing thinking and negative self-talk. Discussed why self-compassion can be helpful in this work - unpacking and playing out the tape of " what it would look like if she was kinder to herself: feels more likely to listen to hunger/fullness cues, satisfaction, honor and respect her body, desire to move her body, desire to choose health-promoting behaviors, etc. Discussed goal to focus on practicing her nuturing thoughts - what she would say to others. Discussed declining weights in doctor's office 2/2 significant ED hx and current DE thoughts. Started exploring HAES and why weight is not only indicator of health, especially given her PE is usually WNL most years.        There are no Patient Instructions on file for this visit.    Monitoring and Evaluation   Food/Nutrient Related History Monitoring  Monitoring and Evaluation Plan: Meal/snack pattern  Knowledge/Beliefs/Attitudes  Monitoring and Evaluation Plan: Food and nutrition knowledge, Beliefs and attitudes        Time Spent  Prep time on day of patient encounter: 5 minutes  Time spent directly with patient, family or caregiver: 50 minutes  Additional Time Spent on Patient Care Activities: 0 minutes  Documentation Time: 10 minutes  Other Time Spent: 0 minutes  Total: 65 minutes

## 2023-09-22 ENCOUNTER — APPOINTMENT (OUTPATIENT)
Dept: PRIMARY CARE | Facility: CLINIC | Age: 43
End: 2023-09-22
Payer: COMMERCIAL

## 2023-09-22 DIAGNOSIS — G47.33 OSA ON CPAP: Primary | ICD-10-CM

## 2023-10-03 ENCOUNTER — NURSE TRIAGE (OUTPATIENT)
Dept: ALLERGY | Facility: CLINIC | Age: 43
End: 2023-10-03
Payer: COMMERCIAL

## 2023-10-03 ENCOUNTER — EDUCATION (OUTPATIENT)
Dept: ALLERGY | Facility: CLINIC | Age: 43
End: 2023-10-03
Payer: COMMERCIAL

## 2023-10-03 DIAGNOSIS — J30.9 ALLERGIC RHINITIS, UNSPECIFIED SEASONALITY, UNSPECIFIED TRIGGER: Primary | ICD-10-CM

## 2023-10-03 PROCEDURE — 95165 ANTIGEN THERAPY SERVICES: CPT | Performed by: ALLERGY & IMMUNOLOGY

## 2023-10-12 ENCOUNTER — CLINICAL SUPPORT (OUTPATIENT)
Dept: ALLERGY | Facility: CLINIC | Age: 43
End: 2023-10-12
Payer: COMMERCIAL

## 2023-10-12 DIAGNOSIS — J30.9 ALLERGIC RHINITIS, UNSPECIFIED SEASONALITY, UNSPECIFIED TRIGGER: ICD-10-CM

## 2023-10-12 PROCEDURE — 95115 IMMUNOTHERAPY ONE INJECTION: CPT | Performed by: ALLERGY & IMMUNOLOGY

## 2023-10-30 ENCOUNTER — LAB (OUTPATIENT)
Dept: LAB | Facility: LAB | Age: 43
End: 2023-10-30
Payer: COMMERCIAL

## 2023-10-30 DIAGNOSIS — E03.8 SUBCLINICAL HYPOTHYROIDISM: ICD-10-CM

## 2023-10-30 LAB
T4 FREE SERPL-MCNC: 0.91 NG/DL (ref 0.78–1.48)
TSH SERPL-ACNC: 2.87 MIU/L (ref 0.44–3.98)

## 2023-10-30 PROCEDURE — 36415 COLL VENOUS BLD VENIPUNCTURE: CPT

## 2023-10-30 PROCEDURE — 84443 ASSAY THYROID STIM HORMONE: CPT

## 2023-10-30 PROCEDURE — 84439 ASSAY OF FREE THYROXINE: CPT

## 2023-11-01 ENCOUNTER — DOCUMENTATION (OUTPATIENT)
Dept: GASTROENTEROLOGY | Facility: CLINIC | Age: 43
End: 2023-11-01
Payer: COMMERCIAL

## 2023-11-07 DIAGNOSIS — R19.7 DIARRHEA, UNSPECIFIED TYPE: Primary | ICD-10-CM

## 2023-11-07 RX ORDER — DIPHENOXYLATE HYDROCHLORIDE AND ATROPINE SULFATE 2.5; .025 MG/1; MG/1
1 TABLET ORAL 3 TIMES DAILY PRN
Qty: 30 TABLET | Refills: 0 | Status: SHIPPED | OUTPATIENT
Start: 2023-11-07 | End: 2023-11-09 | Stop reason: SDUPTHER

## 2023-11-09 ENCOUNTER — SPECIALTY PHARMACY (OUTPATIENT)
Dept: PHARMACY | Facility: CLINIC | Age: 43
End: 2023-11-09

## 2023-11-09 ENCOUNTER — CLINICAL SUPPORT (OUTPATIENT)
Dept: ALLERGY | Facility: CLINIC | Age: 43
End: 2023-11-09
Payer: COMMERCIAL

## 2023-11-09 ENCOUNTER — PHARMACY VISIT (OUTPATIENT)
Dept: PHARMACY | Facility: CLINIC | Age: 43
End: 2023-11-09
Payer: COMMERCIAL

## 2023-11-09 DIAGNOSIS — J30.9 ALLERGIC RHINITIS, UNSPECIFIED SEASONALITY, UNSPECIFIED TRIGGER: ICD-10-CM

## 2023-11-09 DIAGNOSIS — R19.7 DIARRHEA, UNSPECIFIED TYPE: ICD-10-CM

## 2023-11-09 PROCEDURE — 95115 IMMUNOTHERAPY ONE INJECTION: CPT | Performed by: ALLERGY & IMMUNOLOGY

## 2023-11-09 RX ORDER — DIPHENOXYLATE HYDROCHLORIDE AND ATROPINE SULFATE 2.5; .025 MG/1; MG/1
1 TABLET ORAL 3 TIMES DAILY PRN
Qty: 30 TABLET | Refills: 0 | Status: SHIPPED | OUTPATIENT
Start: 2023-11-09 | End: 2023-11-27 | Stop reason: SDUPTHER

## 2023-11-13 ENCOUNTER — OFFICE VISIT (OUTPATIENT)
Dept: PRIMARY CARE | Facility: CLINIC | Age: 43
End: 2023-11-13
Payer: COMMERCIAL

## 2023-11-13 VITALS
TEMPERATURE: 97.1 F | WEIGHT: 196 LBS | HEART RATE: 90 BPM | DIASTOLIC BLOOD PRESSURE: 76 MMHG | BODY MASS INDEX: 34.73 KG/M2 | SYSTOLIC BLOOD PRESSURE: 126 MMHG | OXYGEN SATURATION: 97 % | HEIGHT: 63 IN

## 2023-11-13 DIAGNOSIS — E66.01 CLASS 2 SEVERE OBESITY DUE TO EXCESS CALORIES WITH SERIOUS COMORBIDITY AND BODY MASS INDEX (BMI) OF 35.0 TO 35.9 IN ADULT (MULTI): ICD-10-CM

## 2023-11-13 DIAGNOSIS — Z00.00 ENCOUNTER FOR PREVENTATIVE ADULT HEALTH CARE EXAMINATION: ICD-10-CM

## 2023-11-13 DIAGNOSIS — F31.9 BIPOLAR DISORDER WITH DEPRESSION (MULTI): ICD-10-CM

## 2023-11-13 DIAGNOSIS — J01.90 ACUTE NON-RECURRENT SINUSITIS, UNSPECIFIED LOCATION: Primary | ICD-10-CM

## 2023-11-13 PROBLEM — E66.812 CLASS 2 SEVERE OBESITY DUE TO EXCESS CALORIES WITH SERIOUS COMORBIDITY AND BODY MASS INDEX (BMI) OF 35.0 TO 35.9 IN ADULT: Status: ACTIVE | Noted: 2023-01-25

## 2023-11-13 PROCEDURE — 1036F TOBACCO NON-USER: CPT | Performed by: INTERNAL MEDICINE

## 2023-11-13 PROCEDURE — 99214 OFFICE O/P EST MOD 30 MIN: CPT | Performed by: INTERNAL MEDICINE

## 2023-11-13 PROCEDURE — 3008F BODY MASS INDEX DOCD: CPT | Performed by: INTERNAL MEDICINE

## 2023-11-13 RX ORDER — BIMATOPROST 3 UG/ML
SOLUTION TOPICAL
COMMUNITY
Start: 2023-08-17 | End: 2023-11-13 | Stop reason: ALTCHOICE

## 2023-11-13 RX ORDER — CALCIPOTRIENE 50 UG/G
OINTMENT TOPICAL
COMMUNITY
Start: 2023-06-06 | End: 2023-11-13 | Stop reason: ALTCHOICE

## 2023-11-13 RX ORDER — METHYLPHENIDATE HYDROCHLORIDE 10 MG/1
10 TABLET ORAL DAILY
COMMUNITY
End: 2023-11-13 | Stop reason: ALTCHOICE

## 2023-11-13 RX ORDER — METRONIDAZOLE 7.5 MG/G
CREAM TOPICAL
COMMUNITY
Start: 2023-07-18 | End: 2023-11-13 | Stop reason: ALTCHOICE

## 2023-11-13 RX ORDER — METHYLPHENIDATE HYDROCHLORIDE 18 MG/1
18 TABLET ORAL
COMMUNITY
Start: 2023-11-05 | End: 2024-04-18 | Stop reason: WASHOUT

## 2023-11-13 RX ORDER — PREDNISONE 10 MG/1
TABLET ORAL
COMMUNITY
End: 2023-11-13 | Stop reason: ALTCHOICE

## 2023-11-13 RX ORDER — TRIAMCINOLONE ACETONIDE 1 MG/G
CREAM TOPICAL
COMMUNITY
End: 2023-11-13 | Stop reason: ALTCHOICE

## 2023-11-13 RX ORDER — ESCITALOPRAM OXALATE 10 MG/1
10 TABLET ORAL DAILY
COMMUNITY
End: 2023-11-13 | Stop reason: ALTCHOICE

## 2023-11-13 RX ORDER — LUMATEPERONE 42 MG/1
CAPSULE ORAL
COMMUNITY
Start: 2023-11-09 | End: 2024-04-18 | Stop reason: WASHOUT

## 2023-11-13 RX ORDER — DICYCLOMINE HYDROCHLORIDE 10 MG/1
1 CAPSULE ORAL EVERY 6 HOURS PRN
COMMUNITY
Start: 2023-06-01 | End: 2023-11-13 | Stop reason: ALTCHOICE

## 2023-11-13 RX ORDER — HYDROCORTISONE 25 MG/G
OINTMENT TOPICAL
COMMUNITY
Start: 2023-08-17 | End: 2024-04-18 | Stop reason: WASHOUT

## 2023-11-13 RX ORDER — BUDESONIDE 0.5 MG/2ML
1 INHALANT ORAL 2 TIMES DAILY
COMMUNITY
End: 2024-04-18 | Stop reason: WASHOUT

## 2023-11-13 RX ORDER — AZITHROMYCIN 250 MG/1
TABLET, FILM COATED ORAL
Qty: 6 TABLET | Refills: 0 | Status: SHIPPED | OUTPATIENT
Start: 2023-11-13 | End: 2023-11-18

## 2023-11-13 RX ORDER — GABAPENTIN 100 MG/1
1 CAPSULE ORAL NIGHTLY
COMMUNITY
End: 2023-11-13 | Stop reason: ALTCHOICE

## 2023-11-13 RX ORDER — ACETAMINOPHEN, DEXTROMETHORPHAN HBR, DOXYLAMINE SUCCINATE, PHENYLEPHRINE HCL 650; 20; 12.5; 1 MG/30ML; MG/30ML; MG/30ML; MG/30ML
SOLUTION ORAL
COMMUNITY
Start: 2022-05-23

## 2023-11-13 RX ORDER — ONDANSETRON HYDROCHLORIDE 4 MG/1
TABLET, FILM COATED ORAL
COMMUNITY
Start: 2023-09-15 | End: 2023-11-13 | Stop reason: ALTCHOICE

## 2023-11-13 RX ORDER — NARATRIPTAN 2.5 MG/1
2.5 TABLET ORAL
COMMUNITY
End: 2023-11-13 | Stop reason: ALTCHOICE

## 2023-11-13 ASSESSMENT — ENCOUNTER SYMPTOMS
DIZZINESS: 1
SINUS PAIN: 1
SINUS PRESSURE: 1
FATIGUE: 1
HEADACHES: 1

## 2023-11-13 NOTE — PROGRESS NOTES
"Subjective   Patient ID: Michell Francis is a 43 y.o. female who presents for Follow-up (Also sinusitis x2 months.  Negative covid19).    Allergy shots monthly.  Less migraines with nurtec.  IBS improved on viberzi, heartburn followed with GI.  Bipolar improved with caplyta.  ADHD, psych changed to concerta.  Stabbing pain in face, ear pain, coughing x 2 months.  Light green drainage.  Occasional low grade temp.  Negative for covid.  Allegra as needed.  Stop thyroid med, panic attacks improved.  Meds and labs reviewed.     Review of Systems   Constitutional:  Positive for fatigue.   HENT:  Positive for congestion, ear pain, sinus pressure and sinus pain.    Neurological:  Positive for dizziness and headaches.       Objective   /76 (BP Location: Right arm, Patient Position: Sitting)   Pulse 90   Temp 36.2 °C (97.1 °F)   Ht 1.588 m (5' 2.5\")   Wt 88.9 kg (196 lb)   SpO2 97%   BMI 35.28 kg/m²     Physical Exam  Constitutional:       Appearance: Normal appearance.   HENT:      Right Ear: Tympanic membrane normal.      Left Ear: Tympanic membrane normal.      Mouth/Throat:      Mouth: Mucous membranes are moist.      Pharynx: Posterior oropharyngeal erythema present. No oropharyngeal exudate.   Cardiovascular:      Rate and Rhythm: Normal rate and regular rhythm.      Pulses: Normal pulses.      Heart sounds: Normal heart sounds.   Pulmonary:      Effort: Pulmonary effort is normal.      Breath sounds: Normal breath sounds.   Neurological:      General: No focal deficit present.      Mental Status: She is alert.   Psychiatric:         Mood and Affect: Mood normal.         Behavior: Behavior normal.         Thought Content: Thought content normal.         Judgment: Judgment normal.       Assessment/Plan   Problem List Items Addressed This Visit             ICD-10-CM    Bipolar disorder with depression (CMS/HCC) F31.9     Condition improved, followed with psych.         Class 2 severe obesity due to excess calories " with serious comorbidity and body mass index (BMI) of 35.0 to 35.9 in adult (CMS/[facility]) E66.01, Z68.35     Recommend diet, exercise and weight management.           Acute non-recurrent sinusitis - Primary J01.90     Rec zpack.  Rec allegra and flonase/nasacort spray daily.         Relevant Medications    azithromycin (Zithromax) 250 mg tablet     Other Visit Diagnoses         Codes    Encounter for preventative adult health care examination     Z00.00    Relevant Orders    CBC and Auto Differential    Comprehensive metabolic panel    Lipid Panel    TSH with reflex to Free T4 if abnormal    Vitamin D 25-Hydroxy,Total (for eval of Vitamin D levels)    Urinalysis with Reflex Microscopic

## 2023-11-15 DIAGNOSIS — K58.0 IRRITABLE BOWEL SYNDROME WITH DIARRHEA: Primary | ICD-10-CM

## 2023-11-17 RX ORDER — ELUXADOLINE 75 MG/1
1 TABLET, FILM COATED ORAL 2 TIMES DAILY
Qty: 60 TABLET | Refills: 2 | Status: SHIPPED | OUTPATIENT
Start: 2023-11-17 | End: 2024-02-01 | Stop reason: SDUPTHER

## 2023-11-27 DIAGNOSIS — R19.7 DIARRHEA, UNSPECIFIED TYPE: ICD-10-CM

## 2023-11-27 RX ORDER — DIPHENOXYLATE HYDROCHLORIDE AND ATROPINE SULFATE 2.5; .025 MG/1; MG/1
1 TABLET ORAL 3 TIMES DAILY PRN
Qty: 30 TABLET | Refills: 0 | Status: SHIPPED | OUTPATIENT
Start: 2023-11-27

## 2023-12-06 ENCOUNTER — SPECIALTY PHARMACY (OUTPATIENT)
Dept: PHARMACY | Facility: CLINIC | Age: 43
End: 2023-12-06

## 2023-12-06 DIAGNOSIS — G43.909 MIGRAINE WITHOUT STATUS MIGRAINOSUS, NOT INTRACTABLE, UNSPECIFIED MIGRAINE TYPE: Primary | ICD-10-CM

## 2023-12-06 PROCEDURE — RXMED WILLOW AMBULATORY MEDICATION CHARGE

## 2023-12-07 ENCOUNTER — LAB (OUTPATIENT)
Dept: LAB | Facility: LAB | Age: 43
End: 2023-12-07
Payer: COMMERCIAL

## 2023-12-07 ENCOUNTER — CLINICAL SUPPORT (OUTPATIENT)
Dept: ALLERGY | Facility: CLINIC | Age: 43
End: 2023-12-07
Payer: COMMERCIAL

## 2023-12-07 DIAGNOSIS — F31.32 BIPOLAR DISORDER, CURRENT EPISODE DEPRESSED, MODERATE (MULTI): Primary | ICD-10-CM

## 2023-12-07 DIAGNOSIS — J30.9 ALLERGIC RHINITIS, UNSPECIFIED SEASONALITY, UNSPECIFIED TRIGGER: ICD-10-CM

## 2023-12-07 PROCEDURE — 36415 COLL VENOUS BLD VENIPUNCTURE: CPT

## 2023-12-07 PROCEDURE — 80048 BASIC METABOLIC PNL TOTAL CA: CPT

## 2023-12-07 PROCEDURE — 95115 IMMUNOTHERAPY ONE INJECTION: CPT | Performed by: ALLERGY & IMMUNOLOGY

## 2023-12-08 LAB
ANION GAP SERPL CALC-SCNC: 15 MMOL/L (ref 10–20)
BUN SERPL-MCNC: 10 MG/DL (ref 6–23)
CALCIUM SERPL-MCNC: 9.2 MG/DL (ref 8.6–10.6)
CHLORIDE SERPL-SCNC: 100 MMOL/L (ref 98–107)
CO2 SERPL-SCNC: 25 MMOL/L (ref 21–32)
CREAT SERPL-MCNC: 0.72 MG/DL (ref 0.5–1.05)
GFR SERPL CREATININE-BSD FRML MDRD: >90 ML/MIN/1.73M*2
GLUCOSE SERPL-MCNC: 98 MG/DL (ref 74–99)
POTASSIUM SERPL-SCNC: 4.6 MMOL/L (ref 3.5–5.3)
SODIUM SERPL-SCNC: 135 MMOL/L (ref 136–145)

## 2023-12-11 ENCOUNTER — APPOINTMENT (OUTPATIENT)
Dept: NEUROLOGY | Facility: CLINIC | Age: 43
End: 2023-12-11
Payer: COMMERCIAL

## 2023-12-13 ENCOUNTER — PHARMACY VISIT (OUTPATIENT)
Dept: PHARMACY | Facility: CLINIC | Age: 43
End: 2023-12-13
Payer: COMMERCIAL

## 2024-01-04 ENCOUNTER — APPOINTMENT (OUTPATIENT)
Dept: ALLERGY | Facility: CLINIC | Age: 44
End: 2024-01-04
Payer: COMMERCIAL

## 2024-01-10 ENCOUNTER — APPOINTMENT (OUTPATIENT)
Dept: ALLERGY | Facility: CLINIC | Age: 44
End: 2024-01-10
Payer: COMMERCIAL

## 2024-01-10 ENCOUNTER — SPECIALTY PHARMACY (OUTPATIENT)
Dept: PHARMACY | Facility: CLINIC | Age: 44
End: 2024-01-10

## 2024-01-17 ENCOUNTER — CLINICAL SUPPORT (OUTPATIENT)
Dept: ALLERGY | Facility: CLINIC | Age: 44
End: 2024-01-17
Payer: COMMERCIAL

## 2024-01-17 DIAGNOSIS — J30.9 ALLERGIC RHINITIS, UNSPECIFIED SEASONALITY, UNSPECIFIED TRIGGER: ICD-10-CM

## 2024-01-17 PROCEDURE — 95115 IMMUNOTHERAPY ONE INJECTION: CPT | Performed by: ALLERGY & IMMUNOLOGY

## 2024-02-01 DIAGNOSIS — K58.0 IRRITABLE BOWEL SYNDROME WITH DIARRHEA: ICD-10-CM

## 2024-02-02 RX ORDER — ELUXADOLINE 75 MG/1
1 TABLET, FILM COATED ORAL 2 TIMES DAILY
Qty: 180 TABLET | Refills: 0 | Status: SHIPPED | OUTPATIENT
Start: 2024-02-02 | End: 2024-04-29 | Stop reason: SDUPTHER

## 2024-02-14 ENCOUNTER — SPECIALTY PHARMACY (OUTPATIENT)
Dept: PHARMACY | Facility: CLINIC | Age: 44
End: 2024-02-14

## 2024-02-15 ENCOUNTER — CLINICAL SUPPORT (OUTPATIENT)
Dept: ALLERGY | Facility: CLINIC | Age: 44
End: 2024-02-15
Payer: COMMERCIAL

## 2024-02-15 DIAGNOSIS — J30.2 SEASONAL ALLERGIES: ICD-10-CM

## 2024-02-15 PROCEDURE — 95115 IMMUNOTHERAPY ONE INJECTION: CPT | Performed by: ALLERGY & IMMUNOLOGY

## 2024-03-14 ENCOUNTER — CLINICAL SUPPORT (OUTPATIENT)
Dept: ALLERGY | Facility: CLINIC | Age: 44
End: 2024-03-14
Payer: COMMERCIAL

## 2024-03-14 DIAGNOSIS — J30.2 SEASONAL ALLERGIES: ICD-10-CM

## 2024-03-14 PROCEDURE — 95115 IMMUNOTHERAPY ONE INJECTION: CPT | Performed by: ALLERGY & IMMUNOLOGY

## 2024-03-19 ENCOUNTER — SPECIALTY PHARMACY (OUTPATIENT)
Dept: PHARMACY | Facility: CLINIC | Age: 44
End: 2024-03-19

## 2024-03-20 ENCOUNTER — SPECIALTY PHARMACY (OUTPATIENT)
Dept: PHARMACY | Facility: CLINIC | Age: 44
End: 2024-03-20

## 2024-03-30 ENCOUNTER — TELEMEDICINE (OUTPATIENT)
Dept: PRIMARY CARE | Facility: CLINIC | Age: 44
End: 2024-03-30
Payer: COMMERCIAL

## 2024-03-30 DIAGNOSIS — J20.9 ACUTE BRONCHITIS, UNSPECIFIED ORGANISM: Primary | ICD-10-CM

## 2024-03-30 PROCEDURE — 3008F BODY MASS INDEX DOCD: CPT | Performed by: FAMILY MEDICINE

## 2024-03-30 PROCEDURE — 99213 OFFICE O/P EST LOW 20 MIN: CPT | Performed by: FAMILY MEDICINE

## 2024-03-30 RX ORDER — AZITHROMYCIN 250 MG/1
TABLET, FILM COATED ORAL
Qty: 6 TABLET | Refills: 0 | Status: SHIPPED | OUTPATIENT
Start: 2024-03-30 | End: 2024-04-18 | Stop reason: WASHOUT

## 2024-03-30 NOTE — PROGRESS NOTES
Subjective   Patient ID: Michell Francis is a 43 y.o. female who presents for a cough and hoarseness.    HPI  The patient is a 43 years old female seen today for the treatment of a sore throat, cough, profuse nasal purulent drainage, and voice hoarseness that started 6 days ago and is progressing.  A review of system was completed.  All systems were reviewed and were normal, except for the ones that are listed in the HPI.    Objective   Physical Exam    Assessment/Plan   Problem List Items Addressed This Visit       Acute bronchitis - Primary     -Z-Matt was empirically prescribed.         Relevant Medications    azithromycin (Zithromax Z-Matt) 250 mg tablet        Patient to return to office if not better within a week.

## 2024-04-11 ENCOUNTER — CLINICAL SUPPORT (OUTPATIENT)
Dept: ALLERGY | Facility: CLINIC | Age: 44
End: 2024-04-11
Payer: COMMERCIAL

## 2024-04-11 DIAGNOSIS — J30.2 SEASONAL ALLERGIES: ICD-10-CM

## 2024-04-11 PROCEDURE — 95117 IMMUNOTHERAPY INJECTIONS: CPT | Performed by: ALLERGY & IMMUNOLOGY

## 2024-04-18 ENCOUNTER — OFFICE VISIT (OUTPATIENT)
Dept: GASTROENTEROLOGY | Facility: CLINIC | Age: 44
End: 2024-04-18
Payer: COMMERCIAL

## 2024-04-18 DIAGNOSIS — R14.0 ABDOMINAL BLOATING: Primary | ICD-10-CM

## 2024-04-18 DIAGNOSIS — R19.7 DIARRHEA, UNSPECIFIED TYPE: ICD-10-CM

## 2024-04-18 DIAGNOSIS — M79.7 FIBROMYALGIA: ICD-10-CM

## 2024-04-18 PROCEDURE — 99214 OFFICE O/P EST MOD 30 MIN: CPT | Performed by: NURSE PRACTITIONER

## 2024-04-18 PROCEDURE — 3008F BODY MASS INDEX DOCD: CPT | Performed by: NURSE PRACTITIONER

## 2024-04-18 NOTE — PROGRESS NOTES
This note was created using voice recognition transcription software. Despite proofreading, unintentional typographical errors may be present. Please contact the GI office with any questions or concerns.       This is a 42 yo F who has an annual follow-up.  She is an established patient of Dr. Chacon. LOV 6/7/22. She last saw me on 6/1/23. She has a PMH of bipolar, multiple cervical fusions, chronic neck pain, fibromyalgia, and IBS-D.      6/7/22-At the LOV, she was started on Xifaxin 550 mg tid x 2 weeks. pH Bravo testing was negative. She states that the diarrhea was worse. She states it increased in frequency. She went from 2-3 BM's to 5-6 BM's daily. She went back on Lomotil right away and now she is having 2 BM's that are liquid to soft in consistency.   She still takes the Pepcid 20 mg daily because the Omeprazole was giving her headaches. She always has the feeling of thick saliva in her throat. She states that it helps her with her other symptoms.   She states she has chronic nausea x 1 year.   She started on a Nurti-System diet and is taking in a lot of vegetables. She gets sick with broccoli.   We discussed Bravo results.    8/2/22-She still takes the Pepcid 20 mg daily because the Omeprazole was giving her headaches. She always has the feeling of thick saliva in her throat. She states that it helps her with her other symptoms.   She states she has chronic nausea x 1 year.   She started on a Nurti-System diet and is taking in a lot of vegetables. She gets sick with broccoli.   We discussed Bravo results.      3/7/23-She is taking Viberzi twice a day. She will intermittently use Lomotil. She recently went to [facility] for a bad reaction to a psych Lybalvi. She was on it for a few days. She got a bad migraine. Her neurtec wasn't working. She started throwing up and having diarrhea. The diarrhea came first. She received a migraine cocktail. She states they ruled out food poisoning.   She was going to Dr. Candelario in  [place] and she states she's due for a colonoscopy. She brought a copy of CT of her A/P and 2/9/23 and it showed thickening of the terminal ileum and associated fat stranding and free fluid. No fevers, chills, skin rashes, and not worsening joint pain. She states she has diarrhea when she wakes up and when she takes her Viberzi, \"things bulk up\".       6/1/23-She states that she is still having bilateral lower abdominal pain. She is still having diarrhea first thing in the morning but the Viberzi is helping and it starts to become formed throughout the day. She went to the GYN recently, had an US done and was told the abdominal cramps were not GYN related. She still takes Pepcid 20 mg daily but doesn't think it's helping. She goes to counseling to help with her anxiety. She still takes Meloxicam once daily for fibromyalgia and chronic neck pain. She states she had stool studies a long time ago and doesn't know the results.      4/18/24-She is still using Viberzi and it helps a lot.  Some days she has bad diarrhea.  No real triggers notated.  She started semaglutide and it helped at first but then stopped working.  She was using Gas-x.  She has bloating.  She stopped taking Meloxicam.  She is taking IBP every 3 days.            EGD-6/16/22 2 cm hiatal hernia  pH Bravo-6/16/22 reflux hypersensitivity  Colonoscopy-3/15/23 hemorrhoids and erythema in terminal ileum bx -, 2017, and 2009  BM-3 times daily. Liquid in the morning then formed. No bleeding or weight loss.   FHX-Dad has polyps/pancreatitis   SHX-ETOH 5 times a week. She will drink 1 Truly [place] drink. No illicits or tobacco.   Ab Sx-Abdominoplasty (2013)  NSAIDs-Taking IBP every 3 days and Tylenol twice daily.        A 10 point review of system is negative except for what is mentioned in the HPI      Investigations:  Labs, radiological imaging and cardiac work up were reviewed        Medication Documentation Review Audit       Reviewed by Gonzalo Quiroz, " MD (Physician) on 11/13/23 at 0852      Medication Order Taking? Sig Documenting Provider Last Dose Status   acetaminophen (Tylenol) 500 mg tablet [id number] Yes Take 1-2 tablets (500-1,000 mg) by mouth 2 times a day. Ronda Johnson MD Taking Active   Discontinued 11/13/23 0841   budesonide (Pulmicort) 0.5 mg/2 mL nebulizer solution [id number] No Take 1 vial by nebulization 2 times a day. Ronda Johnson MD Not Taking Active     Discontinued 11/13/23 0841   Caplyta 42 mg capsule [id number] Yes  Ronda Johnson MD Taking Active   cholecalciferol (Vitamin D-3) 50 MCG (2000 UT) tablet [id number] Yes Take by mouth. Ronda Johnson MD Taking Active   clobetasol (Temovate) 0.05 % ointment [id number] Yes APPLY TO AFFECTED AREA TWICE A DAY FOR A MONTH THEN DAILY FOR A MONTH THEN TWICE WEEKLY FOR A MONTH Ronda Johnson MD Taking Active   clonazePAM (KlonoPIN) 0.5 mg tablet [id number] Yes clonazePAM 0.5 MG Oral Tablet   Refills: 0        Start : 16-Sep-2021   Active Historical MD Elizabeth Taking Active   cyanocobalamin, vitamin B-12, (Vitamin B-12) 1,000 mcg tablet extended release [id number] Yes Take by mouth. Ronda Johnson MD Taking Active    Discontinued 11/13/23 0842     Discontinued 11/07/23 1410     Discontinued 11/09/23 1541   diphenoxylate-atropine (Lomotil) 2.5-0.025 mg tablet [id number] Yes Take 1 tablet by mouth 3 times a day as needed for diarrhea. LOTUS Arellano-CNP Taking Active   drospirenone-ethinyl estradioL (Shefali, Ocella) 3-0.03 mg tablet [id number] Yes Take by mouth. Ronda Johnson MD Taking Active   eluxadoline (Viberzi) 75 mg tablet [id number] Yes Take 1 tablet (75 mg) by mouth 2 times a day. Ronda Johnson MD Taking Active     Discontinued 11/13/23 0842   famotidine (Pepcid) 20 mg tablet [id number] Yes Take 1 tablet (20 mg) by mouth once daily. Ronda Johnson MD Taking Active     Discontinued 11/13/23 0842   hydrocortisone 2.5 % ointment [id number] Yes APPLY  TO AFFECTED AREAS TWICE DAILY FOR TWO WEEKS AND THEN AS NEEDED FOR FLARES Historical Provider, MD Taking Active   lamoTRIgine (LaMICtal) 100 mg disintegrating tablet [id number] Yes Take 1 tablet (100 mg) by mouth once daily. Historical Provider, MD Taking Active     Discontinued 11/13/23 0843   lifitegrast (Xiidra) 5 % dropperette [id number] Yes Administer into affected eye(s). Historical Provider, MD Taking Active     Discontinued 11/13/23 0843     Discontinued 11/13/23 0844   methylphenidate ER (Concerta) 18 mg daily tablet [id number] Yes Take 1 tablet (18 mg) by mouth once daily in the morning. Take before meals. Historical Provider, MD Taking Active     Discontinued 11/13/23 0844     Discontinued 11/13/23 0844     Discontinued 11/13/23 0844     Discontinued 11/13/23 0845   rimegepant (NURTEC) 75 mg tablet,disintegrating [id number] Yes Take by mouth. Take one tablet every 48 hours for migraine relief and prevention Historical Provider, MD Taking Active     Discontinued 11/13/23 0845   simethicone (Mylicon) 80 mg chewable tablet [id number]  1 tablet 4 times daily after meals and at bedtime Historical Provider, MD  Active     Discontinued 11/13/23 0845     Discontinued 11/13/23 0845                     Assessment:    This is a 42 yo F who has an annual follow-up.  She is an established patient of Dr. Chacon. LOV 6/7/22. She last saw me on 6/1/23. She has a PMH of bipolar, multiple cervical fusions, chronic neck pain, fibromyalgia, and IBS-D.  Still taking Viberzi and has good results with intermittent breakthrough diarrhea. Prior stool studies were negative.  I don't see where she's been tested for celiac so we will do that now.  Also should be tested for sucrose intolerance.  She is also taking Pepcid for heart burn control.      Plan  1.)  Colonoscopy screening-Colonoscopy due 3/30  2.)  Chronic diarrhea-Continue Viberzi, celiac panel, sucrose testing kit.  Avoid NSAIDs.  Rheumatology referral for fibromyalgia treatment.  3.)   Heartburn-Continue daily Pepcid.  Can take an extra one prn.  4.)  Follow-up as needed.      Phone call lasted 22 mins.  I spent 30 minutes in the professional and overall care of this patient.

## 2024-04-26 ENCOUNTER — SPECIALTY PHARMACY (OUTPATIENT)
Dept: PHARMACY | Facility: CLINIC | Age: 44
End: 2024-04-26

## 2024-04-29 ENCOUNTER — SPECIALTY PHARMACY (OUTPATIENT)
Dept: PHARMACY | Facility: CLINIC | Age: 44
End: 2024-04-29

## 2024-04-29 ENCOUNTER — TELEPHONE (OUTPATIENT)
Dept: GASTROENTEROLOGY | Facility: CLINIC | Age: 44
End: 2024-04-29
Payer: COMMERCIAL

## 2024-04-29 DIAGNOSIS — K58.0 IRRITABLE BOWEL SYNDROME WITH DIARRHEA: ICD-10-CM

## 2024-04-29 PROBLEM — R05.9 COUGH: Status: ACTIVE | Noted: 2024-04-29

## 2024-04-29 PROBLEM — R40.0 DAYTIME SOMNOLENCE: Status: ACTIVE | Noted: 2024-04-29

## 2024-04-29 PROBLEM — R20.8 DYSESTHESIA: Status: ACTIVE | Noted: 2024-04-29

## 2024-04-29 PROBLEM — D80.6 DEFICIENCY OF ANTI-PNEUMOCOCCAL POLYSACCHARIDE ANTIBODY (MULTI): Status: ACTIVE | Noted: 2024-04-29

## 2024-04-29 PROBLEM — R73.09 INCREASED GLUCOSE LEVEL: Status: ACTIVE | Noted: 2023-01-25

## 2024-04-29 PROBLEM — R63.5 WEIGHT GAIN: Status: ACTIVE | Noted: 2024-04-29

## 2024-04-29 PROBLEM — G47.01 INSOMNIA DUE TO MEDICAL CONDITION: Status: ACTIVE | Noted: 2024-04-29

## 2024-04-29 PROBLEM — Z20.822 SUSPECTED SEVERE ACUTE RESPIRATORY SYNDROME CORONAVIRUS 2 (SARS-COV-2) INFECTION: Status: ACTIVE | Noted: 2024-04-29

## 2024-04-29 PROBLEM — L98.9 INFLAMMATORY DERMATOSIS: Status: ACTIVE | Noted: 2024-04-29

## 2024-04-29 PROBLEM — R06.09 DYSPNEA ON EXERTION: Status: ACTIVE | Noted: 2024-04-29

## 2024-04-29 PROBLEM — B02.8 HERPES ZOSTER INFECTION OF ORAL MUCOSA: Status: ACTIVE | Noted: 2024-04-29

## 2024-04-29 PROBLEM — R32 URINARY INCONTINENCE: Status: ACTIVE | Noted: 2024-04-29

## 2024-04-29 PROBLEM — E83.42 HYPOMAGNESEMIA: Status: ACTIVE | Noted: 2024-04-29

## 2024-04-29 PROBLEM — E83.39 HYPOPHOSPHATEMIA: Status: ACTIVE | Noted: 2024-04-29

## 2024-04-29 PROBLEM — M79.10 MUSCLE PAIN: Status: ACTIVE | Noted: 2024-04-29

## 2024-04-29 RX ORDER — DICYCLOMINE HYDROCHLORIDE 10 MG/1
1 CAPSULE ORAL EVERY 6 HOURS PRN
COMMUNITY

## 2024-05-03 RX ORDER — ELUXADOLINE 75 MG/1
1 TABLET, FILM COATED ORAL 2 TIMES DAILY
Qty: 180 TABLET | Refills: 3 | Status: SHIPPED | OUTPATIENT
Start: 2024-05-03 | End: 2025-05-03

## 2024-05-08 ENCOUNTER — LAB (OUTPATIENT)
Dept: LAB | Facility: LAB | Age: 44
End: 2024-05-08
Payer: COMMERCIAL

## 2024-05-08 ENCOUNTER — CLINICAL SUPPORT (OUTPATIENT)
Dept: ALLERGY | Facility: CLINIC | Age: 44
End: 2024-05-08
Payer: COMMERCIAL

## 2024-05-08 DIAGNOSIS — Z00.00 ENCOUNTER FOR PREVENTATIVE ADULT HEALTH CARE EXAMINATION: ICD-10-CM

## 2024-05-08 DIAGNOSIS — R19.7 DIARRHEA, UNSPECIFIED TYPE: ICD-10-CM

## 2024-05-08 DIAGNOSIS — R14.0 ABDOMINAL BLOATING: ICD-10-CM

## 2024-05-08 DIAGNOSIS — J30.2 SEASONAL ALLERGIES: ICD-10-CM

## 2024-05-08 LAB
25(OH)D3 SERPL-MCNC: 52 NG/ML (ref 30–100)
ALBUMIN SERPL BCP-MCNC: 3.9 G/DL (ref 3.4–5)
ALP SERPL-CCNC: 50 U/L (ref 33–110)
ALT SERPL W P-5'-P-CCNC: 21 U/L (ref 7–45)
ANION GAP SERPL CALC-SCNC: 14 MMOL/L (ref 10–20)
APPEARANCE UR: CLEAR
AST SERPL W P-5'-P-CCNC: 20 U/L (ref 9–39)
BASOPHILS # BLD AUTO: 0.04 X10*3/UL (ref 0–0.1)
BASOPHILS NFR BLD AUTO: 0.6 %
BILIRUB SERPL-MCNC: 0.5 MG/DL (ref 0–1.2)
BILIRUB UR STRIP.AUTO-MCNC: NEGATIVE MG/DL
BUN SERPL-MCNC: 11 MG/DL (ref 6–23)
CALCIUM SERPL-MCNC: 9 MG/DL (ref 8.6–10.6)
CHLORIDE SERPL-SCNC: 105 MMOL/L (ref 98–107)
CHOLEST SERPL-MCNC: 147 MG/DL (ref 0–199)
CHOLESTEROL/HDL RATIO: 2.2
CO2 SERPL-SCNC: 24 MMOL/L (ref 21–32)
COLOR UR: NORMAL
CREAT SERPL-MCNC: 0.65 MG/DL (ref 0.5–1.05)
EGFRCR SERPLBLD CKD-EPI 2021: >90 ML/MIN/1.73M*2
EOSINOPHIL # BLD AUTO: 0.15 X10*3/UL (ref 0–0.7)
EOSINOPHIL NFR BLD AUTO: 2.1 %
ERYTHROCYTE [DISTWIDTH] IN BLOOD BY AUTOMATED COUNT: 12.7 % (ref 11.5–14.5)
GLIADIN PEPTIDE IGA SER IA-ACNC: 1.3 U/ML
GLUCOSE SERPL-MCNC: 89 MG/DL (ref 74–99)
GLUCOSE UR STRIP.AUTO-MCNC: NORMAL MG/DL
HCT VFR BLD AUTO: 40.8 % (ref 36–46)
HDLC SERPL-MCNC: 66.7 MG/DL
HGB BLD-MCNC: 13.5 G/DL (ref 12–16)
IMM GRANULOCYTES # BLD AUTO: 0.03 X10*3/UL (ref 0–0.7)
IMM GRANULOCYTES NFR BLD AUTO: 0.4 % (ref 0–0.9)
KETONES UR STRIP.AUTO-MCNC: NEGATIVE MG/DL
LDLC SERPL CALC-MCNC: 45 MG/DL
LEUKOCYTE ESTERASE UR QL STRIP.AUTO: NEGATIVE
LYMPHOCYTES # BLD AUTO: 2.25 X10*3/UL (ref 1.2–4.8)
LYMPHOCYTES NFR BLD AUTO: 32.1 %
MCH RBC QN AUTO: 30.8 PG (ref 26–34)
MCHC RBC AUTO-ENTMCNC: 33.1 G/DL (ref 32–36)
MCV RBC AUTO: 93 FL (ref 80–100)
MONOCYTES # BLD AUTO: 0.55 X10*3/UL (ref 0.1–1)
MONOCYTES NFR BLD AUTO: 7.8 %
NEUTROPHILS # BLD AUTO: 4 X10*3/UL (ref 1.2–7.7)
NEUTROPHILS NFR BLD AUTO: 57 %
NITRITE UR QL STRIP.AUTO: NEGATIVE
NON HDL CHOLESTEROL: 80 MG/DL (ref 0–149)
NRBC BLD-RTO: 0 /100 WBCS (ref 0–0)
PH UR STRIP.AUTO: 6.5 [PH]
PLATELET # BLD AUTO: 283 X10*3/UL (ref 150–450)
POTASSIUM SERPL-SCNC: 4.5 MMOL/L (ref 3.5–5.3)
PROT SERPL-MCNC: 7 G/DL (ref 6.4–8.2)
PROT UR STRIP.AUTO-MCNC: NEGATIVE MG/DL
RBC # BLD AUTO: 4.39 X10*6/UL (ref 4–5.2)
RBC # UR STRIP.AUTO: NEGATIVE /UL
SODIUM SERPL-SCNC: 138 MMOL/L (ref 136–145)
SP GR UR STRIP.AUTO: 1.01
T4 FREE SERPL-MCNC: 1.01 NG/DL (ref 0.78–1.48)
TRIGL SERPL-MCNC: 176 MG/DL (ref 0–149)
TSH SERPL-ACNC: 4.46 MIU/L (ref 0.44–3.98)
TTG IGA SER IA-ACNC: <1 U/ML
UROBILINOGEN UR STRIP.AUTO-MCNC: NORMAL MG/DL
VLDL: 35 MG/DL (ref 0–40)
WBC # BLD AUTO: 7 X10*3/UL (ref 4.4–11.3)

## 2024-05-08 PROCEDURE — 80061 LIPID PANEL: CPT

## 2024-05-08 PROCEDURE — 36415 COLL VENOUS BLD VENIPUNCTURE: CPT

## 2024-05-08 PROCEDURE — 84443 ASSAY THYROID STIM HORMONE: CPT

## 2024-05-08 PROCEDURE — 85025 COMPLETE CBC W/AUTO DIFF WBC: CPT

## 2024-05-08 PROCEDURE — 83516 IMMUNOASSAY NONANTIBODY: CPT

## 2024-05-08 PROCEDURE — 84439 ASSAY OF FREE THYROXINE: CPT

## 2024-05-08 PROCEDURE — 95115 IMMUNOTHERAPY ONE INJECTION: CPT | Performed by: ALLERGY & IMMUNOLOGY

## 2024-05-08 PROCEDURE — 80053 COMPREHEN METABOLIC PANEL: CPT

## 2024-05-08 PROCEDURE — 81003 URINALYSIS AUTO W/O SCOPE: CPT

## 2024-05-08 PROCEDURE — 82306 VITAMIN D 25 HYDROXY: CPT

## 2024-05-09 LAB
GLIADIN PEPTIDE IGG SER IA-ACNC: 2.07 FLU (ref 0–4.99)
TTG IGG SER IA-ACNC: <0.82 FLU (ref 0–4.99)

## 2024-05-13 ENCOUNTER — APPOINTMENT (OUTPATIENT)
Dept: PRIMARY CARE | Facility: CLINIC | Age: 44
End: 2024-05-13
Payer: COMMERCIAL

## 2024-05-14 ENCOUNTER — APPOINTMENT (OUTPATIENT)
Dept: NEUROLOGY | Facility: CLINIC | Age: 44
End: 2024-05-14
Payer: COMMERCIAL

## 2024-06-06 ENCOUNTER — APPOINTMENT (OUTPATIENT)
Dept: ALLERGY | Facility: CLINIC | Age: 44
End: 2024-06-06
Payer: COMMERCIAL

## 2024-06-12 ENCOUNTER — APPOINTMENT (OUTPATIENT)
Dept: ALLERGY | Facility: CLINIC | Age: 44
End: 2024-06-12
Payer: COMMERCIAL

## 2024-06-12 DIAGNOSIS — J30.2 SEASONAL ALLERGIES: ICD-10-CM

## 2024-06-12 PROCEDURE — 95115 IMMUNOTHERAPY ONE INJECTION: CPT | Performed by: ALLERGY & IMMUNOLOGY

## 2024-06-18 ASSESSMENT — PROMIS GLOBAL HEALTH SCALE
CARRYOUT_PHYSICAL_ACTIVITIES: MOSTLY
RATE_AVERAGE_PAIN: 4
RATE_PHYSICAL_HEALTH: FAIR
RATE_MENTAL_HEALTH: FAIR
CARRYOUT_SOCIAL_ACTIVITIES: VERY GOOD
EMOTIONAL_PROBLEMS: OFTEN
RATE_SOCIAL_SATISFACTION: VERY GOOD
RATE_GENERAL_HEALTH: GOOD
RATE_QUALITY_OF_LIFE: GOOD
RATE_AVERAGE_FATIGUE: SEVERE

## 2024-06-20 ENCOUNTER — APPOINTMENT (OUTPATIENT)
Dept: PRIMARY CARE | Facility: CLINIC | Age: 44
End: 2024-06-20
Payer: COMMERCIAL

## 2024-06-20 VITALS
BODY MASS INDEX: 32.6 KG/M2 | SYSTOLIC BLOOD PRESSURE: 124 MMHG | DIASTOLIC BLOOD PRESSURE: 72 MMHG | OXYGEN SATURATION: 99 % | HEIGHT: 63 IN | WEIGHT: 184 LBS | HEART RATE: 99 BPM

## 2024-06-20 DIAGNOSIS — G89.29 CHRONIC PAIN OF MULTIPLE SITES: ICD-10-CM

## 2024-06-20 DIAGNOSIS — Z79.1 NSAID LONG-TERM USE: ICD-10-CM

## 2024-06-20 DIAGNOSIS — E03.8 SUBCLINICAL HYPOTHYROIDISM: ICD-10-CM

## 2024-06-20 DIAGNOSIS — M79.7 FIBROMYALGIA: ICD-10-CM

## 2024-06-20 DIAGNOSIS — R52 CHRONIC PAIN OF MULTIPLE SITES: ICD-10-CM

## 2024-06-20 DIAGNOSIS — Z00.00 ENCOUNTER FOR PREVENTATIVE ADULT HEALTH CARE EXAMINATION: Primary | ICD-10-CM

## 2024-06-20 DIAGNOSIS — F31.9 BIPOLAR DISORDER WITH DEPRESSION (MULTI): ICD-10-CM

## 2024-06-20 PROBLEM — R05.9 COUGH: Status: RESOLVED | Noted: 2024-04-29 | Resolved: 2024-06-20

## 2024-06-20 PROBLEM — J45.909 REACTIVE AIRWAY DISEASE (HHS-HCC): Status: RESOLVED | Noted: 2023-01-25 | Resolved: 2024-06-20

## 2024-06-20 PROBLEM — D80.6 DEFICIENCY OF ANTI-PNEUMOCOCCAL POLYSACCHARIDE ANTIBODY (MULTI): Status: RESOLVED | Noted: 2024-04-29 | Resolved: 2024-06-20

## 2024-06-20 PROBLEM — J20.9 ACUTE BRONCHITIS: Status: RESOLVED | Noted: 2024-03-30 | Resolved: 2024-06-20

## 2024-06-20 PROCEDURE — 3008F BODY MASS INDEX DOCD: CPT | Performed by: INTERNAL MEDICINE

## 2024-06-20 PROCEDURE — 99396 PREV VISIT EST AGE 40-64: CPT | Performed by: INTERNAL MEDICINE

## 2024-06-20 PROCEDURE — 1036F TOBACCO NON-USER: CPT | Performed by: INTERNAL MEDICINE

## 2024-06-20 RX ORDER — IBUPROFEN 200 MG
200 TABLET ORAL EVERY 6 HOURS
COMMUNITY
End: 2024-06-20 | Stop reason: ALTCHOICE

## 2024-06-20 RX ORDER — MINERAL OIL
180 ENEMA (ML) RECTAL DAILY
COMMUNITY

## 2024-06-20 RX ORDER — MELOXICAM 7.5 MG/1
7.5 TABLET ORAL 2 TIMES DAILY PRN
Qty: 60 TABLET | Refills: 3 | Status: SHIPPED | OUTPATIENT
Start: 2024-06-20

## 2024-06-20 ASSESSMENT — ENCOUNTER SYMPTOMS
ALLERGIC/IMMUNOLOGIC NEGATIVE: 1
EYES NEGATIVE: 1
NEUROLOGICAL NEGATIVE: 1
RESPIRATORY NEGATIVE: 1
GASTROINTESTINAL NEGATIVE: 1
ENDOCRINE NEGATIVE: 1
CARDIOVASCULAR NEGATIVE: 1
HEMATOLOGIC/LYMPHATIC NEGATIVE: 1
FATIGUE: 1
SLEEP DISTURBANCE: 1

## 2024-07-09 ENCOUNTER — APPOINTMENT (OUTPATIENT)
Dept: NEUROLOGY | Facility: CLINIC | Age: 44
End: 2024-07-09
Payer: COMMERCIAL

## 2024-07-09 DIAGNOSIS — G43.909 MIGRAINE WITHOUT STATUS MIGRAINOSUS, NOT INTRACTABLE, UNSPECIFIED MIGRAINE TYPE: ICD-10-CM

## 2024-07-09 PROCEDURE — 3008F BODY MASS INDEX DOCD: CPT | Performed by: NURSE PRACTITIONER

## 2024-07-09 PROCEDURE — 99214 OFFICE O/P EST MOD 30 MIN: CPT | Performed by: NURSE PRACTITIONER

## 2024-07-09 NOTE — PROGRESS NOTES
Patient being assessed today for follow-up of migraine.  She reports that she is doing well on the Nurtec for abortive treatment.  She does utilize it 2 or 3 times per month.  It is typically effective with 1 dose.  Reports that she does become a little sleepy when she takes it although it does not hinder her activities for the day.  We will continue the Nurtec as she has been taking it.  Discussed role of medicine, importance of taking medications, potential risks, benefits, and precautions to be taken.  Reviewed sleep hygiene and dietary modifications.  Follow-up in 1 year or sooner if needed.    This note was created with voice recognition software and was not corrected for typographical or grammatical errors

## 2024-07-10 ENCOUNTER — APPOINTMENT (OUTPATIENT)
Dept: ALLERGY | Facility: CLINIC | Age: 44
End: 2024-07-10
Payer: COMMERCIAL

## 2024-07-10 DIAGNOSIS — J30.2 SEASONAL ALLERGIES: ICD-10-CM

## 2024-07-10 PROCEDURE — RXMED WILLOW AMBULATORY MEDICATION CHARGE

## 2024-07-10 PROCEDURE — 95115 IMMUNOTHERAPY ONE INJECTION: CPT | Performed by: ALLERGY & IMMUNOLOGY

## 2024-07-15 ENCOUNTER — APPOINTMENT (OUTPATIENT)
Dept: RHEUMATOLOGY | Facility: CLINIC | Age: 44
End: 2024-07-15
Payer: COMMERCIAL

## 2024-07-15 ENCOUNTER — LAB (OUTPATIENT)
Dept: LAB | Facility: LAB | Age: 44
End: 2024-07-15
Payer: COMMERCIAL

## 2024-07-15 VITALS — WEIGHT: 184 LBS | BODY MASS INDEX: 33.12 KG/M2 | DIASTOLIC BLOOD PRESSURE: 73 MMHG | SYSTOLIC BLOOD PRESSURE: 134 MMHG

## 2024-07-15 DIAGNOSIS — M79.7 FIBROMYALGIA: ICD-10-CM

## 2024-07-15 DIAGNOSIS — R52 CHRONIC PAIN OF MULTIPLE SITES: Primary | ICD-10-CM

## 2024-07-15 DIAGNOSIS — G89.29 CHRONIC PAIN OF MULTIPLE SITES: ICD-10-CM

## 2024-07-15 DIAGNOSIS — G89.29 CHRONIC PAIN OF MULTIPLE SITES: Primary | ICD-10-CM

## 2024-07-15 DIAGNOSIS — M47.819 SPONDYLOSIS WITHOUT MYELOPATHY OR RADICULOPATHY: ICD-10-CM

## 2024-07-15 DIAGNOSIS — R52 CHRONIC PAIN OF MULTIPLE SITES: ICD-10-CM

## 2024-07-15 LAB
CCP IGG SERPL-ACNC: <1 U/ML
CENTROMERE B AB SER-ACNC: <0.2 AI
CHROMATIN AB SERPL-ACNC: <0.2 AI
CRP SERPL-MCNC: 1.88 MG/DL
DSDNA AB SER-ACNC: 1 IU/ML
ENA JO1 AB SER QL IA: <0.2 AI
ENA RNP AB SER IA-ACNC: <0.2 AI
ENA SCL70 AB SER QL IA: <0.2 AI
ENA SM AB SER IA-ACNC: <0.2 AI
ENA SM+RNP AB SER QL IA: <0.2 AI
ENA SS-A AB SER IA-ACNC: <0.2 AI
ENA SS-B AB SER IA-ACNC: <0.2 AI
ERYTHROCYTE [SEDIMENTATION RATE] IN BLOOD BY WESTERGREN METHOD: 5 MM/H (ref 0–20)
HCV AB SER QL: NONREACTIVE
RHEUMATOID FACT SER NEPH-ACNC: 10 IU/ML (ref 0–15)
RIBOSOMAL P AB SER-ACNC: <0.2 AI

## 2024-07-15 PROCEDURE — 36415 COLL VENOUS BLD VENIPUNCTURE: CPT

## 2024-07-15 PROCEDURE — 86235 NUCLEAR ANTIGEN ANTIBODY: CPT

## 2024-07-15 PROCEDURE — 86225 DNA ANTIBODY NATIVE: CPT

## 2024-07-15 PROCEDURE — 85652 RBC SED RATE AUTOMATED: CPT

## 2024-07-15 PROCEDURE — 99204 OFFICE O/P NEW MOD 45 MIN: CPT | Performed by: INTERNAL MEDICINE

## 2024-07-15 PROCEDURE — 86431 RHEUMATOID FACTOR QUANT: CPT

## 2024-07-15 PROCEDURE — 86200 CCP ANTIBODY: CPT

## 2024-07-15 PROCEDURE — 86803 HEPATITIS C AB TEST: CPT

## 2024-07-15 PROCEDURE — 86140 C-REACTIVE PROTEIN: CPT

## 2024-07-15 PROCEDURE — 81381 HLA I TYPING 1 ALLELE HR: CPT

## 2024-07-15 PROCEDURE — 86038 ANTINUCLEAR ANTIBODIES: CPT

## 2024-07-15 NOTE — LETTER
July 16, 2024     Gonzalo Quiroz MD  [address]  [facility], [address]  [place] [address]    Patient: Michell Francis   YOB: 1980   Date of Visit: 7/15/2024       Dear Dr. Gonzalo Quiroz MD:    Thank you for referring Michell Francis to me for evaluation. Below are my notes for this consultation.  If you have questions, please do not hesitate to call me. I look forward to following your patient along with you.       Sincerely,     Nima Pop MD      CC: No Recipients  ______________________________________________________________________________________    Subjective. Michell Francis is a 43 y.o. female who referred for fibromyalgia and joint pain evaluation.    HPI.  43-year-old female with history of FMS, bipolar depression, migraine headaches, IBS-D and s/p cervical spine fusion surgery referred by primary care physician Dr.Raymond LEANNE Quiroz MD.    She stated that primary care physician and neurosurgeon diagnosed her with fibromyalgia in 2015.  She stated that she has had chronic neck pain and received corticosteroid injections through pain management.  She underwent 4 neck surgeries.  Last surgery was done in 2019 when refused C3-T1.  She reports pain and stiffness of the hands, shoulders and knees.  She has been having  joint pain and stiffness for the past 15 years.  She has not noticed swelling of the joints.  She rates pain up to 3/10 at times.  -She reports severe fatigue,  brain fog and forgetfulness.  -She reports severe dry eyes.  She has had punctal plugging.  She reports dry mouth without oral ulcers.  She has negative ALLISON, anti-SSA, anti-SSB and rheumatoid factor.  -She reports random rashes involving the face and cheeks.  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   -She is recently diagnosed with genital lichen sclerosis.  She uses clobetasol.    Social history: She is .  She does not smoke.  She drinks alcoholic beverages 4 times per week.  Family  history: Father has had high cholesterol and pulmonary embolism.    Review of Systems   All other systems reviewed and are negative.    Objective    Blood pressure 134/73, weight 83.5 kg (184 lb).    Physical Exam.  Gen. AAO x3, NAD.  HEENT: No pallor or icterus, PERRLA, EOMI. Oropharynx is clear. MM moist,Parotid glands  not enlarged. No cervical lymphadenopathy .  Skin: No rashes.  Heart: S1, S2/ RRR. No murmurs or gallops.  Lungs: CTA B.  Abdomen: Soft, NT/ND, BS regular.  MSK: No.swelling or tenderness of the  upper or lower extremity joints with full ROM, Neck,spine and Diaz SI with out tenderness.  Neuro: CN II-XII intact. Sensation to touch intact.Strength 5/5 throughout. DTR 2+ and symmetrical.  Psych:Appropriate mood and behavior  EXT: No edema    Assessment/Plan. 43-year-old female with history of FMS, bipolar depression, migraine headaches, IBS-D and s/p cervical spine fusion surgery presented with chronic intermittent polyarthralgia, facial rash, sicca symptoms, fatigue, brain fog and poor memory.  Previous labs showed negative ALLISON, RF, anti-SSA and anti-SSB.    We will obtain labs for further evaluation.  If workup is unrevealing, symptoms are most likely related to fibromyalgia.  Patient was counseled regarding the concepts of pathophysiology, management and outcomes of fibromyalgia.  Explained to the patient that fibromyalgia is not an autoimmune disease.    Patient was advised to build up stepwise aerobic exercises.  She can try antidepressant, gabapentin or Lyrica in consultation with primary care physician and psychiatrist.     This note was partially generated using the Dragon Voice recognition system. There may be some incorrect wording, spelling and/or spelling errors or punctuation errors that were not corrected prior to committing the note to the medical record.    Problem List Items Addressed This Visit       Chronic pain of multiple sites - Primary    Relevant Orders    Hepatitis C Antibody  (Completed)    Rheumatoid Factor (Completed)    Sedimentation Rate (Completed)    HLAB27 Typing    C-Reactive Protein (Completed)    Citrulline Antibody, IgG (Completed)    ALLISON + MENDOZA Panel    Fibromyalgia    Relevant Orders    Hepatitis C Antibody (Completed)    Rheumatoid Factor (Completed)    Sedimentation Rate (Completed)    HLAB27 Typing    C-Reactive Protein (Completed)    Citrulline Antibody, IgG (Completed)    ALLISON + MENDOZA Panel    Spondylosis without myelopathy or radiculopathy    Relevant Orders    Hepatitis C Antibody (Completed)    Rheumatoid Factor (Completed)    Sedimentation Rate (Completed)    HLAB27 Typing    C-Reactive Protein (Completed)    Citrulline Antibody, IgG (Completed)    ALLISON + MENDOZA Panel            Active Ambulatory Problems     Diagnosis Date Noted   • Allergic rhinitis 01/25/2023   • Allergy to cats 01/25/2023   • Allergy to dogs 01/25/2023   • Anxiety and depression 01/25/2023   • Bipolar disorder with depression (Multi) 01/25/2023   • Cervical disc disorder 01/25/2023   • Chronic pain of multiple sites 01/25/2023   • Fibromyalgia 01/25/2023   • Chronic pansinusitis 01/25/2023   • Deviated nasal septum 01/25/2023   • Dry eye 01/25/2023   • Fatigue 01/25/2023   • Gassiness 01/25/2023   • Gastroesophageal reflux disease 01/25/2023   • Hypertrophy of inferior nasal turbinate 01/25/2023   • Internal hemorrhoids 01/25/2023   • Irritable bowel syndrome with diarrhea 01/25/2023   • Low vitamin B12 level 01/25/2023   • Memory loss 01/25/2023   • Migraine without status migrainosus, not intractable 01/25/2023   • Nasal polyp 01/25/2023   • Numbness and tingling in both hands 01/25/2023   • ELIGIO (obstructive sleep apnea) 01/25/2023   • Postnasal drip 01/25/2023   • Sensorineural hearing loss (SNHL) of both ears 01/25/2023   • Subclinical hypothyroidism 01/25/2023   • Suspected sleep apnea 01/25/2023   • Vitamin D deficiency 01/25/2023   • Class 2 severe obesity due to excess calories with serious  comorbidity and body mass index (BMI) of 35.0 to 35.9 in adult (Multi) 01/25/2023   • Abnormal CT of the abdomen 03/16/2023   • Alopecia areata 12/22/2016   • Anxiety 03/16/2023   • Candidiasis of vagina 03/16/2023   • Cervical spondylosis with myelopathy 05/16/2019   • Cognitive dysfunction 12/22/2016   • Cramp in limb 03/16/2023   • Diarrhea in adult patient 03/16/2023   • Dry eye syndrome of bilateral lacrimal glands 12/22/2016   • Dysplasia of cervix, low grade (MANUEL 1) 01/01/2020   • Herniated cervical intervertebral disc 03/16/2023   • History of electroconvulsive therapy 01/01/2017   • Lower abdominal pain 03/16/2023   • Migraine without aura and with status migrainosus, not intractable 03/16/2023   • Osteoarthritis of spine with radiculopathy, cervical region 11/15/2018   • Spinal stenosis of cervical region 06/17/2016   • Spondylosis without myelopathy or radiculopathy 03/16/2023   • Sleep disorder 12/22/2016   • Nasal septal deviation 03/16/2023   • Chronic diarrhea 03/16/2023   • IBS (irritable bowel syndrome) 03/16/2023   • Migraines 03/16/2023   • Encounter for preventative adult health care examination 05/02/2023   • Acute non-recurrent sinusitis 11/13/2023   • Seasonal allergies 02/15/2024   • Daytime somnolence 04/29/2024   • Dysesthesia 04/29/2024   • Dyspnea on exertion 04/29/2024   • Herpes zoster infection of oral mucosa 04/29/2024   • Hypomagnesemia 04/29/2024   • Hypophosphatemia 04/29/2024   • Increased glucose level 01/25/2023   • Inflammatory dermatosis 04/29/2024   • Insomnia due to medical condition 04/29/2024   • Muscle pain 04/29/2024   • Suspected severe acute respiratory syndrome coronavirus 2 (SARS-CoV-2) infection 04/29/2024   • Urinary incontinence 04/29/2024   • Weight gain 04/29/2024     Resolved Ambulatory Problems     Diagnosis Date Noted   • Reactive airway disease (HHS-HCC) 01/25/2023   • Regional enteritis of ileum (Multi) 03/16/2023   • Bipolar 1 disorder (Multi) 03/16/2023    • Acute bronchitis 03/30/2024   • Cough 04/29/2024   • 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) 04/29/2024     Past Medical History:   Diagnosis Date   • Abnormal weight gain 03/31/2017   • Abnormal weight loss 08/06/2014   • Acute candidiasis of vulva and vagina 10/19/2015   • Acute recurrent maxillary sinusitis 04/15/2021   • Acute sinusitis, unspecified 01/11/2018   • Acute upper respiratory infection, unspecified 11/16/2020   • Body mass index (BMI) 35.0-35.9, adult 08/02/2022   • Cervicalgia 12/05/2014   • Disorder of external ear, unspecified, unspecified ear 04/17/2015   • Fever, unspecified 04/24/2020   • Headache, unspecified 09/20/2017   • Mental disorder, not otherwise specified    • Nasal congestion 10/12/2015   • Noninfective gastroenteritis and colitis, unspecified 12/04/2015   • Other chest pain 04/24/2020   • Other conditions influencing health status 04/24/2020   • Other conditions influencing health status 03/31/2017   • Other injury of unspecified body region, initial encounter 11/03/2020   • Other specified disorders of eustachian tube, bilateral 04/15/2021   • Personal history of diseases of the skin and subcutaneous tissue 06/08/2018   • Personal history of other diseases of the digestive system 02/23/2018   • Personal history of other diseases of the digestive system 01/20/2014   • Personal history of other diseases of the musculoskeletal system and connective tissue 04/30/2015   • Personal history of other diseases of the musculoskeletal system and connective tissue 01/11/2018   • Personal history of other diseases of the nervous system and sense organs 04/17/2015   • Personal history of other diseases of the respiratory system 10/10/2017   • Personal history of other diseases of the respiratory system 10/12/2015   • Personal history of other diseases of the respiratory system 02/19/2020   • Personal history of other infectious and parasitic diseases 11/21/2019   •  Personal history of other infectious and parasitic diseases 04/10/2018   • Personal history of other mental and behavioral disorders 10/31/2017   • Personal history of other mental and behavioral disorders    • Personal history of other specified conditions 08/06/2014   • Personal history of other specified conditions 08/06/2014   • Personal history of other specified conditions 03/26/2018   • Personal history of other specified conditions 01/11/2018   • Personal history of other specified conditions 12/31/2019   • Personal history of other specified conditions 11/03/2020   • Personal history of other specified conditions 09/20/2017   • Personal history of other specified conditions 10/15/2014   • Unspecified lump in the left breast, unspecified quadrant 11/24/2015       Family History   Problem Relation Name Age of Onset   • Other (heart problems) Mother     • Pancreatitis Father     • Epilepsy Sister     • Alzheimer's disease Paternal Grandfather     • Diabetes Other Grandfather    • Other (cardiac disorder) Other Grandfather        Past Surgical History:   Procedure Laterality Date   • BREAST SURGERY  10/31/2017    Breast Surgery Reduction Procedure   • COLONOSCOPY  12/13/2017    Complete Colonoscopy   • NECK SURGERY  03/12/2020    Neck Surgery   • OTHER SURGICAL HISTORY  10/31/2017    Abdominal Surgery   • OTHER SURGICAL HISTORY  06/24/2022    Esophageal manometry   • OTHER SURGICAL HISTORY  06/22/2022    Esophagogastroduodenoscopy   • SINUS SURGERY  10/31/2017    Sinus Surgery       Social History     Tobacco Use   Smoking Status Former   • Types: Cigarettes   Smokeless Tobacco Never       Allergies  Animal dander and Cat dander    Current Meds  Current Outpatient Medications   Medication Instructions   • acetaminophen (Tylenol) 500 mg tablet 1-2 tablets, oral, 2 times daily   • amphetamine 5 mg tablet, IR - ER, biphasic 24hr Take 2.5 mg by mouth once daily.   • cholecalciferol (Vitamin D-3) 50 MCG (2000 UT)  tablet oral   • clobetasol (Temovate) 0.05 % ointment APPLY TO AFFECTED AREA TWICE A DAY FOR A MONTH THEN DAILY FOR A MONTH THEN TWICE WEEKLY FOR A MONTH   • clonazePAM (KlonoPIN) 0.5 mg tablet clonazePAM 0.5 MG Oral Tablet   Refills: 0        Start : 16-Sep-2021   Active   • cyanocobalamin, vitamin B-12, (Vitamin B-12) 1,000 mcg tablet extended release oral   • diphenoxylate-atropine (Lomotil) 2.5-0.025 mg tablet 1 tablet, oral, 3 times daily PRN   • drospirenone-ethinyl estradioL (Shefali, Ocella) 3-0.03 mg tablet oral   • famotidine (Pepcid) 20 mg tablet 1 tablet, oral, Daily   • fexofenadine (ALLEGRA) 180 mg, oral, Daily   • lamoTRIgine (LaMICtal) 100 mg tablet 1 tablet, oral, Daily   • lifitegrast (Xiidra) 5 % dropperette ophthalmic (eye)   • meloxicam (MOBIC) 7.5 mg, oral, 2 times daily PRN   • rimegepant (NURTEC) 75 mg tablet,disintegrating ALLOW 1 TABLET TO DISSOLVE ON OR UNDER THE TONGUE EVERY 48 HOURS FOR MIGRAINE RELIEF AND PREVENTION   • semaglutide 0.5 mg, subcutaneous, Every 7 days   • Viberzi 75 mg, oral, 2 times daily        Lab Results   Component Value Date    RF 10 07/15/2024    SEDRATE 5 07/15/2024    CRP 1.88 (H) 07/15/2024    DNADS 1.0 07/15/2024             Nima Pop MD

## 2024-07-16 NOTE — PROGRESS NOTES
Subjective . Michell Francis is a 43 y.o. female who referred for fibromyalgia and joint pain evaluation.    HPI.  43-year-old female with history of FMS, bipolar depression, migraine headaches, IBS-D and s/p cervical spine fusion surgery referred by primary care physician Dr.Raymond LEANNE Quiroz MD.    She stated that primary care physician and neurosurgeon diagnosed her with fibromyalgia in 2015.  She stated that she has had chronic neck pain and received corticosteroid injections through pain management.  She underwent 4 neck surgeries.  Last surgery was done in 2019 when refused C3-T1.  She reports pain and stiffness of the hands, shoulders and knees.  She has been having  joint pain and stiffness for the past 15 years.  She has not noticed swelling of the joints.  She rates pain up to 3/10 at times.  -She reports severe fatigue,  brain fog and forgetfulness.  -She reports severe dry eyes.  She has had punctal plugging.  She reports dry mouth without oral ulcers.  She has negative ALLISON, anti-SSA, anti-SSB and rheumatoid factor.  -She reports random rashes involving the face and cheeks.  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   -She is recently diagnosed with genital lichen sclerosis.  She uses clobetasol.    Social history: She is .  She does not smoke.  She drinks alcoholic beverages 4 times per week.  Family history: Father has had high cholesterol and pulmonary embolism.    Review of Systems   All other systems reviewed and are negative.    Objective     Blood pressure 134/73, weight 83.5 kg (184 lb).    Physical Exam.  Gen. AAO x3, NAD.  HEENT: No pallor or icterus, PERRLA, EOMI. Oropharynx is clear. MM moist,Parotid glands  not enlarged. No cervical lymphadenopathy .  Skin: No rashes.  Heart: S1, S2/ RRR. No murmurs or gallops.  Lungs: CTA B.  Abdomen: Soft, NT/ND, BS regular.  MSK: No.swelling or tenderness of the  upper or lower extremity joints with full ROM, Neck,spine and Diaz SI  with out tenderness.  Neuro: CN II-XII intact. Sensation to touch intact.Strength 5/5 throughout. DTR 2+ and symmetrical.  Psych:Appropriate mood and behavior  EXT: No edema    Assessment/Plan . 43-year-old female with history of FMS, bipolar depression, migraine headaches, IBS-D and s/p cervical spine fusion surgery presented with chronic intermittent polyarthralgia, facial rash, sicca symptoms, fatigue, brain fog and poor memory.  Previous labs showed negative ALLISON, RF, anti-SSA and anti-SSB.    We will obtain labs for further evaluation.  If workup is unrevealing, symptoms are most likely related to fibromyalgia.  Patient was counseled regarding the concepts of pathophysiology, management and outcomes of fibromyalgia.  Explained to the patient that fibromyalgia is not an autoimmune disease.    Patient was advised to build up stepwise aerobic exercises.  She can try antidepressant, gabapentin or Lyrica in consultation with primary care physician and psychiatrist.     This note was partially generated using the Dragon Voice recognition system. There may be some incorrect wording, spelling and/or spelling errors or punctuation errors that were not corrected prior to committing the note to the medical record.    Problem List Items Addressed This Visit       Chronic pain of multiple sites - Primary    Relevant Orders    Hepatitis C Antibody (Completed)    Rheumatoid Factor (Completed)    Sedimentation Rate (Completed)    HLAB27 Typing    C-Reactive Protein (Completed)    Citrulline Antibody, IgG (Completed)    ALLISON + MENDOZA Panel    Fibromyalgia    Relevant Orders    Hepatitis C Antibody (Completed)    Rheumatoid Factor (Completed)    Sedimentation Rate (Completed)    HLAB27 Typing    C-Reactive Protein (Completed)    Citrulline Antibody, IgG (Completed)    ALLISON + MENDOZA Panel    Spondylosis without myelopathy or radiculopathy    Relevant Orders    Hepatitis C Antibody (Completed)    Rheumatoid Factor (Completed)     Sedimentation Rate (Completed)    HLAB27 Typing    C-Reactive Protein (Completed)    Citrulline Antibody, IgG (Completed)    ALLISON + MENDOZA Panel            Active Ambulatory Problems     Diagnosis Date Noted    Allergic rhinitis 01/25/2023    Allergy to cats 01/25/2023    Allergy to dogs 01/25/2023    Anxiety and depression 01/25/2023    Bipolar disorder with depression (Multi) 01/25/2023    Cervical disc disorder 01/25/2023    Chronic pain of multiple sites 01/25/2023    Fibromyalgia 01/25/2023    Chronic pansinusitis 01/25/2023    Deviated nasal septum 01/25/2023    Dry eye 01/25/2023    Fatigue 01/25/2023    Gassiness 01/25/2023    Gastroesophageal reflux disease 01/25/2023    Hypertrophy of inferior nasal turbinate 01/25/2023    Internal hemorrhoids 01/25/2023    Irritable bowel syndrome with diarrhea 01/25/2023    Low vitamin B12 level 01/25/2023    Memory loss 01/25/2023    Migraine without status migrainosus, not intractable 01/25/2023    Nasal polyp 01/25/2023    Numbness and tingling in both hands 01/25/2023    ELIGIO (obstructive sleep apnea) 01/25/2023    Postnasal drip 01/25/2023    Sensorineural hearing loss (SNHL) of both ears 01/25/2023    Subclinical hypothyroidism 01/25/2023    Suspected sleep apnea 01/25/2023    Vitamin D deficiency 01/25/2023    Class 2 severe obesity due to excess calories with serious comorbidity and body mass index (BMI) of 35.0 to 35.9 in adult (Multi) 01/25/2023    Abnormal CT of the abdomen 03/16/2023    Alopecia areata 12/22/2016    Anxiety 03/16/2023    Candidiasis of vagina 03/16/2023    Cervical spondylosis with myelopathy 05/16/2019    Cognitive dysfunction 12/22/2016    Cramp in limb 03/16/2023    Diarrhea in adult patient 03/16/2023    Dry eye syndrome of bilateral lacrimal glands 12/22/2016    Dysplasia of cervix, low grade (MANEUL 1) 01/01/2020    Herniated cervical intervertebral disc 03/16/2023    History of electroconvulsive therapy 01/01/2017    Lower abdominal pain  03/16/2023    Migraine without aura and with status migrainosus, not intractable 03/16/2023    Osteoarthritis of spine with radiculopathy, cervical region 11/15/2018    Spinal stenosis of cervical region 06/17/2016    Spondylosis without myelopathy or radiculopathy 03/16/2023    Sleep disorder 12/22/2016    Nasal septal deviation 03/16/2023    Chronic diarrhea 03/16/2023    IBS (irritable bowel syndrome) 03/16/2023    Migraines 03/16/2023    Encounter for preventative adult health care examination 05/02/2023    Acute non-recurrent sinusitis 11/13/2023    Seasonal allergies 02/15/2024    Daytime somnolence 04/29/2024    Dysesthesia 04/29/2024    Dyspnea on exertion 04/29/2024    Herpes zoster infection of oral mucosa 04/29/2024    Hypomagnesemia 04/29/2024    Hypophosphatemia 04/29/2024    Increased glucose level 01/25/2023    Inflammatory dermatosis 04/29/2024    Insomnia due to medical condition 04/29/2024    Muscle pain 04/29/2024    Suspected severe acute respiratory syndrome coronavirus 2 (SARS-CoV-2) infection 04/29/2024    Urinary incontinence 04/29/2024    Weight gain 04/29/2024     Resolved Ambulatory Problems     Diagnosis Date Noted    Reactive airway disease ([facility]) 01/25/2023    Regional enteritis of ileum (Multi) 03/16/2023    Bipolar 1 disorder (Multi) 03/16/2023    Acute bronchitis 03/30/2024    Cough 04/29/2024    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) 04/29/2024     Past Medical History:   Diagnosis Date    Abnormal weight gain 03/31/2017    Abnormal weight loss 08/06/2014    Acute candidiasis of vulva and vagina 10/19/2015    Acute recurrent maxillary sinusitis 04/15/2021    Acute sinusitis, unspecified 01/11/2018    Acute upper respiratory infection, unspecified 11/16/2020    Body mass index (BMI) 35.0-35.9, adult 08/02/2022    Cervicalgia 12/05/2014    Disorder of external ear, unspecified, unspecified ear 04/17/2015    Fever, unspecified 04/24/2020    Headache,  unspecified 09/20/2017    Mental disorder, not otherwise specified     Nasal congestion 10/12/2015    Noninfective gastroenteritis and colitis, unspecified 12/04/2015    Other chest pain 04/24/2020    Other conditions influencing health status 04/24/2020    Other conditions influencing health status 03/31/2017    Other injury of unspecified body region, initial encounter 11/03/2020    Other specified disorders of eustachian tube, bilateral 04/15/2021    Personal history of diseases of the skin and subcutaneous tissue 06/08/2018    Personal history of other diseases of the digestive system 02/23/2018    Personal history of other diseases of the digestive system 01/20/2014    Personal history of other diseases of the musculoskeletal system and connective tissue 04/30/2015    Personal history of other diseases of the musculoskeletal system and connective tissue 01/11/2018    Personal history of other diseases of the nervous system and sense organs 04/17/2015    Personal history of other diseases of the respiratory system 10/10/2017    Personal history of other diseases of the respiratory system 10/12/2015    Personal history of other diseases of the respiratory system 02/19/2020    Personal history of other infectious and parasitic diseases 11/21/2019    Personal history of other infectious and parasitic diseases 04/10/2018    Personal history of other mental and behavioral disorders 10/31/2017    Personal history of other mental and behavioral disorders     Personal history of other specified conditions 08/06/2014    Personal history of other specified conditions 08/06/2014    Personal history of other specified conditions 03/26/2018    Personal history of other specified conditions 01/11/2018    Personal history of other specified conditions 12/31/2019    Personal history of other specified conditions 11/03/2020    Personal history of other specified conditions 09/20/2017    Personal history of other specified  conditions 10/15/2014    Unspecified lump in the left breast, unspecified quadrant 11/24/2015       Family History   Problem Relation Name Age of Onset    Other (heart problems) Mother      Pancreatitis Father      Epilepsy Sister      Alzheimer's disease Paternal Grandfather      Diabetes Other Grandfather     Other (cardiac disorder) Other Grandfather        Past Surgical History:   Procedure Laterality Date    BREAST SURGERY  10/31/2017    Breast Surgery Reduction Procedure    COLONOSCOPY  12/13/2017    Complete Colonoscopy    NECK SURGERY  03/12/2020    Neck Surgery    OTHER SURGICAL HISTORY  10/31/2017    Abdominal Surgery    OTHER SURGICAL HISTORY  06/24/2022    Esophageal manometry    OTHER SURGICAL HISTORY  06/22/2022    Esophagogastroduodenoscopy    SINUS SURGERY  10/31/2017    Sinus Surgery       Social History     Tobacco Use   Smoking Status Former    Types: Cigarettes   Smokeless Tobacco Never       Allergies  Animal dander and Cat dander    Current Meds  Current Outpatient Medications   Medication Instructions    acetaminophen (Tylenol) 500 mg tablet 1-2 tablets, oral, 2 times daily    amphetamine 5 mg tablet, IR - ER, biphasic 24hr Take 2.5 mg by mouth once daily.    cholecalciferol (Vitamin D-3) 50 MCG (2000 UT) tablet oral    clobetasol (Temovate) 0.05 % ointment APPLY TO AFFECTED AREA TWICE A DAY FOR A MONTH THEN DAILY FOR A MONTH THEN TWICE WEEKLY FOR A MONTH    clonazePAM (KlonoPIN) 0.5 mg tablet clonazePAM 0.5 MG Oral Tablet   Refills: 0        Start : 16-Sep-2021   Active    cyanocobalamin, vitamin B-12, (Vitamin B-12) 1,000 mcg tablet extended release oral    diphenoxylate-atropine (Lomotil) 2.5-0.025 mg tablet 1 tablet, oral, 3 times daily PRN    drospirenone-ethinyl estradioL (Shefali, Ocella) 3-0.03 mg tablet oral    famotidine (Pepcid) 20 mg tablet 1 tablet, oral, Daily    fexofenadine (ALLEGRA) 180 mg, oral, Daily    lamoTRIgine (LaMICtal) 100 mg tablet 1 tablet, oral, Daily    lifitegrast  (Xiidra) 5 % dropperette ophthalmic (eye)    meloxicam (MOBIC) 7.5 mg, oral, 2 times daily PRN    rimegepant (NURTEC) 75 mg tablet,disintegrating ALLOW 1 TABLET TO DISSOLVE ON OR UNDER THE TONGUE EVERY 48 HOURS FOR MIGRAINE RELIEF AND PREVENTION    semaglutide 0.5 mg, subcutaneous, Every 7 days    Viberzi 75 mg, oral, 2 times daily        Lab Results   Component Value Date    RF 10 07/15/2024    SEDRATE 5 07/15/2024    CRP 1.88 (H) 07/15/2024    DNADS 1.0 07/15/2024             Nima Pop MD

## 2024-07-17 LAB — ANA SER QL HEP2 SUBST: NEGATIVE

## 2024-07-19 ENCOUNTER — PHARMACY VISIT (OUTPATIENT)
Dept: PHARMACY | Facility: CLINIC | Age: 44
End: 2024-07-19
Payer: COMMERCIAL

## 2024-07-19 ENCOUNTER — SPECIALTY PHARMACY (OUTPATIENT)
Dept: PHARMACY | Facility: CLINIC | Age: 44
End: 2024-07-19

## 2024-07-19 LAB — HLAB27 TYPING: NEGATIVE

## 2024-08-08 ENCOUNTER — APPOINTMENT (OUTPATIENT)
Dept: ALLERGY | Facility: CLINIC | Age: 44
End: 2024-08-08
Payer: COMMERCIAL

## 2024-08-08 DIAGNOSIS — J30.2 SEASONAL ALLERGIES: ICD-10-CM

## 2024-08-08 PROCEDURE — 95115 IMMUNOTHERAPY ONE INJECTION: CPT | Performed by: ALLERGY & IMMUNOLOGY

## 2024-08-11 DIAGNOSIS — K58.0 IRRITABLE BOWEL SYNDROME WITH DIARRHEA: ICD-10-CM

## 2024-08-12 ENCOUNTER — SPECIALTY PHARMACY (OUTPATIENT)
Dept: PHARMACY | Facility: CLINIC | Age: 44
End: 2024-08-12

## 2024-08-12 RX ORDER — ELUXADOLINE 75 MG/1
75 TABLET, FILM COATED ORAL 2 TIMES DAILY
Qty: 180 TABLET | Refills: 3 | Status: SHIPPED | OUTPATIENT
Start: 2024-08-12 | End: 2025-08-12

## 2024-08-28 ENCOUNTER — APPOINTMENT (OUTPATIENT)
Dept: ALLERGY | Facility: CLINIC | Age: 44
End: 2024-08-28
Payer: COMMERCIAL

## 2024-09-04 ENCOUNTER — APPOINTMENT (OUTPATIENT)
Dept: ALLERGY | Facility: CLINIC | Age: 44
End: 2024-09-04
Payer: COMMERCIAL

## 2024-09-05 ENCOUNTER — APPOINTMENT (OUTPATIENT)
Dept: ALLERGY | Facility: CLINIC | Age: 44
End: 2024-09-05
Payer: COMMERCIAL

## 2024-09-05 DIAGNOSIS — J30.2 SEASONAL ALLERGIES: ICD-10-CM

## 2024-09-05 PROCEDURE — 95115 IMMUNOTHERAPY ONE INJECTION: CPT | Performed by: ALLERGY & IMMUNOLOGY

## 2024-09-09 ENCOUNTER — SPECIALTY PHARMACY (OUTPATIENT)
Dept: PHARMACY | Facility: CLINIC | Age: 44
End: 2024-09-09

## 2024-09-09 PROCEDURE — RXMED WILLOW AMBULATORY MEDICATION CHARGE

## 2024-09-18 ENCOUNTER — TELEPHONE (OUTPATIENT)
Dept: PRIMARY CARE | Facility: CLINIC | Age: 44
End: 2024-09-18
Payer: COMMERCIAL

## 2024-09-18 DIAGNOSIS — U07.1 COVID-19 VIRUS INFECTION: Primary | ICD-10-CM

## 2024-09-18 NOTE — TELEPHONE ENCOUNTER
Tested + for covid  9/18. Fever 101, achy, stuffy, digestion issues, chills,headache , sinus pressure  symtoms started 9/17,       Pt is requesting to be advised is willing Paxlovid if you think appropriate, please advise

## 2024-09-18 NOTE — TELEPHONE ENCOUNTER
Pt is thinking she wants it, and will only take if she is feeling worse tomorrow, please advise   Would like to know how long she would have been off meds before taking paxlovid, please advise

## 2024-09-20 ENCOUNTER — PHARMACY VISIT (OUTPATIENT)
Dept: PHARMACY | Facility: CLINIC | Age: 44
End: 2024-09-20
Payer: COMMERCIAL

## 2024-09-26 DIAGNOSIS — J01.90 ACUTE NON-RECURRENT SINUSITIS, UNSPECIFIED LOCATION: Primary | ICD-10-CM

## 2024-09-26 RX ORDER — AZITHROMYCIN 250 MG/1
TABLET, FILM COATED ORAL
Qty: 6 TABLET | Refills: 0 | Status: SHIPPED | OUTPATIENT
Start: 2024-09-26 | End: 2024-10-01

## 2024-10-02 ENCOUNTER — APPOINTMENT (OUTPATIENT)
Dept: ALLERGY | Facility: CLINIC | Age: 44
End: 2024-10-02
Payer: COMMERCIAL

## 2024-10-02 DIAGNOSIS — J30.2 SEASONAL ALLERGIES: ICD-10-CM

## 2024-10-02 PROCEDURE — 95115 IMMUNOTHERAPY ONE INJECTION: CPT | Performed by: ALLERGY & IMMUNOLOGY

## 2024-10-14 DIAGNOSIS — J30.81 ANIMAL DANDER ALLERGY: Primary | ICD-10-CM

## 2024-10-14 PROCEDURE — 95165 ANTIGEN THERAPY SERVICES: CPT | Performed by: ALLERGY & IMMUNOLOGY

## 2024-10-17 ENCOUNTER — APPOINTMENT (OUTPATIENT)
Dept: ALLERGY | Facility: CLINIC | Age: 44
End: 2024-10-17
Payer: COMMERCIAL

## 2024-10-21 ENCOUNTER — TELEPHONE (OUTPATIENT)
Dept: PRIMARY CARE | Facility: CLINIC | Age: 44
End: 2024-10-21
Payer: COMMERCIAL

## 2024-10-21 DIAGNOSIS — B37.31 VAGINAL CANDIDIASIS: Primary | ICD-10-CM

## 2024-10-21 RX ORDER — FLUCONAZOLE 150 MG/1
150 TABLET ORAL ONCE
Qty: 2 TABLET | Refills: 1 | Status: SHIPPED | OUTPATIENT
Start: 2024-10-21 | End: 2024-10-21

## 2024-10-29 PROBLEM — J30.81 ALLERGY TO CATS: Status: RESOLVED | Noted: 2023-01-25 | Resolved: 2024-10-29

## 2024-10-30 ENCOUNTER — OFFICE VISIT (OUTPATIENT)
Dept: ALLERGY | Facility: CLINIC | Age: 44
End: 2024-10-30
Payer: COMMERCIAL

## 2024-10-30 ENCOUNTER — APPOINTMENT (OUTPATIENT)
Dept: ALLERGY | Facility: CLINIC | Age: 44
End: 2024-10-30
Payer: COMMERCIAL

## 2024-10-30 VITALS — BODY MASS INDEX: 30.6 KG/M2 | WEIGHT: 170 LBS | OXYGEN SATURATION: 98 % | HEART RATE: 60 BPM

## 2024-10-30 DIAGNOSIS — J30.2 SEASONAL ALLERGIES: ICD-10-CM

## 2024-10-30 DIAGNOSIS — J30.9 ALLERGIC RHINITIS, UNSPECIFIED SEASONALITY, UNSPECIFIED TRIGGER: Primary | ICD-10-CM

## 2024-10-30 PROCEDURE — 95115 IMMUNOTHERAPY ONE INJECTION: CPT | Performed by: ALLERGY & IMMUNOLOGY

## 2024-10-30 PROCEDURE — 99214 OFFICE O/P EST MOD 30 MIN: CPT | Performed by: ALLERGY & IMMUNOLOGY

## 2024-10-30 RX ORDER — BIMATOPROST 3 UG/ML
SOLUTION TOPICAL
COMMUNITY
Start: 2023-12-21

## 2024-10-30 RX ORDER — FLUTICASONE PROPIONATE 50 MCG
1 SPRAY, SUSPENSION (ML) NASAL DAILY
COMMUNITY
Start: 2024-07-18

## 2024-10-30 RX ORDER — TACROLIMUS 1 MG/G
OINTMENT TOPICAL
COMMUNITY
Start: 2024-06-24

## 2024-10-31 ENCOUNTER — TELEPHONE (OUTPATIENT)
Dept: PRIMARY CARE | Facility: CLINIC | Age: 44
End: 2024-10-31
Payer: COMMERCIAL

## 2024-11-27 ENCOUNTER — LAB (OUTPATIENT)
Dept: LAB | Facility: LAB | Age: 44
End: 2024-11-27
Payer: COMMERCIAL

## 2024-11-27 ENCOUNTER — APPOINTMENT (OUTPATIENT)
Dept: ALLERGY | Facility: CLINIC | Age: 44
End: 2024-11-27
Payer: COMMERCIAL

## 2024-11-27 DIAGNOSIS — R52 CHRONIC PAIN OF MULTIPLE SITES: ICD-10-CM

## 2024-11-27 DIAGNOSIS — E03.8 SUBCLINICAL HYPOTHYROIDISM: ICD-10-CM

## 2024-11-27 DIAGNOSIS — J30.2 SEASONAL ALLERGIES: ICD-10-CM

## 2024-11-27 DIAGNOSIS — Z79.1 NSAID LONG-TERM USE: ICD-10-CM

## 2024-11-27 DIAGNOSIS — G89.29 CHRONIC PAIN OF MULTIPLE SITES: ICD-10-CM

## 2024-11-27 DIAGNOSIS — M79.7 FIBROMYALGIA: ICD-10-CM

## 2024-11-27 LAB
ALBUMIN SERPL BCP-MCNC: 4.1 G/DL (ref 3.4–5)
ALP SERPL-CCNC: 51 U/L (ref 33–110)
ALT SERPL W P-5'-P-CCNC: 13 U/L (ref 7–45)
ANION GAP SERPL CALC-SCNC: 14 MMOL/L (ref 10–20)
AST SERPL W P-5'-P-CCNC: 18 U/L (ref 9–39)
BASOPHILS # BLD AUTO: 0.05 X10*3/UL (ref 0–0.1)
BASOPHILS NFR BLD AUTO: 0.6 %
BILIRUB SERPL-MCNC: 0.4 MG/DL (ref 0–1.2)
BUN SERPL-MCNC: 12 MG/DL (ref 6–23)
CALCIUM SERPL-MCNC: 9.5 MG/DL (ref 8.6–10.6)
CHLORIDE SERPL-SCNC: 103 MMOL/L (ref 98–107)
CO2 SERPL-SCNC: 27 MMOL/L (ref 21–32)
CREAT SERPL-MCNC: 0.76 MG/DL (ref 0.5–1.05)
EGFRCR SERPLBLD CKD-EPI 2021: >90 ML/MIN/1.73M*2
EOSINOPHIL # BLD AUTO: 0.22 X10*3/UL (ref 0–0.7)
EOSINOPHIL NFR BLD AUTO: 2.5 %
ERYTHROCYTE [DISTWIDTH] IN BLOOD BY AUTOMATED COUNT: 12.5 % (ref 11.5–14.5)
GLUCOSE SERPL-MCNC: 92 MG/DL (ref 74–99)
HCT VFR BLD AUTO: 40.7 % (ref 36–46)
HGB BLD-MCNC: 13.5 G/DL (ref 12–16)
IMM GRANULOCYTES # BLD AUTO: 0.03 X10*3/UL (ref 0–0.7)
IMM GRANULOCYTES NFR BLD AUTO: 0.3 % (ref 0–0.9)
LYMPHOCYTES # BLD AUTO: 2.67 X10*3/UL (ref 1.2–4.8)
LYMPHOCYTES NFR BLD AUTO: 29.9 %
MCH RBC QN AUTO: 32.5 PG (ref 26–34)
MCHC RBC AUTO-ENTMCNC: 33.2 G/DL (ref 32–36)
MCV RBC AUTO: 98 FL (ref 80–100)
MONOCYTES # BLD AUTO: 0.76 X10*3/UL (ref 0.1–1)
MONOCYTES NFR BLD AUTO: 8.5 %
NEUTROPHILS # BLD AUTO: 5.19 X10*3/UL (ref 1.2–7.7)
NEUTROPHILS NFR BLD AUTO: 58.2 %
NRBC BLD-RTO: 0 /100 WBCS (ref 0–0)
PLATELET # BLD AUTO: 291 X10*3/UL (ref 150–450)
POTASSIUM SERPL-SCNC: 4.8 MMOL/L (ref 3.5–5.3)
PROT SERPL-MCNC: 6.8 G/DL (ref 6.4–8.2)
RBC # BLD AUTO: 4.15 X10*6/UL (ref 4–5.2)
SODIUM SERPL-SCNC: 139 MMOL/L (ref 136–145)
TSH SERPL-ACNC: 2.63 MIU/L (ref 0.44–3.98)
WBC # BLD AUTO: 8.9 X10*3/UL (ref 4.4–11.3)

## 2024-11-27 PROCEDURE — 95117 IMMUNOTHERAPY INJECTIONS: CPT | Performed by: ALLERGY & IMMUNOLOGY

## 2024-11-27 PROCEDURE — 80053 COMPREHEN METABOLIC PANEL: CPT

## 2024-11-27 PROCEDURE — 85025 COMPLETE CBC W/AUTO DIFF WBC: CPT

## 2024-11-27 PROCEDURE — 84443 ASSAY THYROID STIM HORMONE: CPT

## 2024-11-27 PROCEDURE — 36415 COLL VENOUS BLD VENIPUNCTURE: CPT

## 2024-12-04 ENCOUNTER — SPECIALTY PHARMACY (OUTPATIENT)
Dept: PHARMACY | Facility: CLINIC | Age: 44
End: 2024-12-04

## 2024-12-04 PROCEDURE — RXMED WILLOW AMBULATORY MEDICATION CHARGE

## 2024-12-06 ENCOUNTER — PHARMACY VISIT (OUTPATIENT)
Dept: PHARMACY | Facility: CLINIC | Age: 44
End: 2024-12-06
Payer: COMMERCIAL

## 2024-12-06 DIAGNOSIS — R19.7 DIARRHEA, UNSPECIFIED TYPE: ICD-10-CM

## 2024-12-09 ENCOUNTER — APPOINTMENT (OUTPATIENT)
Dept: PRIMARY CARE | Facility: CLINIC | Age: 44
End: 2024-12-09
Payer: COMMERCIAL

## 2024-12-09 VITALS
HEART RATE: 84 BPM | SYSTOLIC BLOOD PRESSURE: 126 MMHG | DIASTOLIC BLOOD PRESSURE: 78 MMHG | WEIGHT: 163 LBS | HEIGHT: 62 IN | OXYGEN SATURATION: 96 % | BODY MASS INDEX: 30 KG/M2

## 2024-12-09 DIAGNOSIS — Z00.00 ENCOUNTER FOR PREVENTATIVE ADULT HEALTH CARE EXAMINATION: ICD-10-CM

## 2024-12-09 DIAGNOSIS — F41.9 ANXIETY AND DEPRESSION: ICD-10-CM

## 2024-12-09 DIAGNOSIS — F32.A ANXIETY AND DEPRESSION: ICD-10-CM

## 2024-12-09 DIAGNOSIS — M79.7 FIBROMYALGIA: Primary | ICD-10-CM

## 2024-12-09 DIAGNOSIS — G43.009 MIGRAINE WITHOUT AURA AND WITHOUT STATUS MIGRAINOSUS, NOT INTRACTABLE: ICD-10-CM

## 2024-12-09 PROBLEM — R63.5 WEIGHT GAIN: Status: RESOLVED | Noted: 2024-04-29 | Resolved: 2024-12-09

## 2024-12-09 PROBLEM — R10.30 LOWER ABDOMINAL PAIN: Status: RESOLVED | Noted: 2023-03-16 | Resolved: 2024-12-09

## 2024-12-09 PROBLEM — E66.812 CLASS 2 SEVERE OBESITY DUE TO EXCESS CALORIES WITH SERIOUS COMORBIDITY AND BODY MASS INDEX (BMI) OF 35.0 TO 35.9 IN ADULT: Status: RESOLVED | Noted: 2023-01-25 | Resolved: 2024-12-09

## 2024-12-09 PROBLEM — E66.01 CLASS 2 SEVERE OBESITY DUE TO EXCESS CALORIES WITH SERIOUS COMORBIDITY AND BODY MASS INDEX (BMI) OF 35.0 TO 35.9 IN ADULT: Status: RESOLVED | Noted: 2023-01-25 | Resolved: 2024-12-09

## 2024-12-09 PROBLEM — H04.129 DRY EYE: Status: RESOLVED | Noted: 2023-01-25 | Resolved: 2024-12-09

## 2024-12-09 PROBLEM — R19.7 DIARRHEA IN ADULT PATIENT: Status: RESOLVED | Noted: 2023-03-16 | Resolved: 2024-12-09

## 2024-12-09 PROBLEM — Z20.822 SUSPECTED SEVERE ACUTE RESPIRATORY SYNDROME CORONAVIRUS 2 (SARS-COV-2) INFECTION: Status: RESOLVED | Noted: 2024-04-29 | Resolved: 2024-12-09

## 2024-12-09 PROBLEM — R14.0 GASSINESS: Status: RESOLVED | Noted: 2023-01-25 | Resolved: 2024-12-09

## 2024-12-09 PROBLEM — B02.8 HERPES ZOSTER INFECTION OF ORAL MUCOSA: Status: RESOLVED | Noted: 2024-04-29 | Resolved: 2024-12-09

## 2024-12-09 PROBLEM — K52.9 CHRONIC DIARRHEA: Status: RESOLVED | Noted: 2023-03-16 | Resolved: 2024-12-09

## 2024-12-09 PROBLEM — J01.90 ACUTE NON-RECURRENT SINUSITIS: Status: RESOLVED | Noted: 2023-11-13 | Resolved: 2024-12-09

## 2024-12-09 PROCEDURE — 99214 OFFICE O/P EST MOD 30 MIN: CPT | Performed by: INTERNAL MEDICINE

## 2024-12-09 PROCEDURE — 3008F BODY MASS INDEX DOCD: CPT | Performed by: INTERNAL MEDICINE

## 2024-12-09 PROCEDURE — 1036F TOBACCO NON-USER: CPT | Performed by: INTERNAL MEDICINE

## 2024-12-09 RX ORDER — DEXTROMETHORPHAN HYDROBROMIDE, BUPROPION HYDROCHLORIDE 105; 45 MG/1; MG/1
TABLET, MULTILAYER, EXTENDED RELEASE ORAL 2 TIMES DAILY
COMMUNITY

## 2024-12-09 RX ORDER — CYCLOBENZAPRINE HCL 10 MG
10 TABLET ORAL 3 TIMES DAILY PRN
COMMUNITY
End: 2024-12-09 | Stop reason: WASHOUT

## 2024-12-09 RX ORDER — LAMOTRIGINE 100 MG/1
50 TABLET ORAL DAILY
Qty: 45 TABLET | Refills: 3 | Status: SHIPPED | OUTPATIENT
Start: 2024-12-09

## 2024-12-09 ASSESSMENT — ENCOUNTER SYMPTOMS
NECK PAIN: 1
FATIGUE: 1
NECK STIFFNESS: 1
HEADACHES: 1
SHORTNESS OF BREATH: 0
DIZZINESS: 0
ARTHRALGIAS: 1

## 2024-12-09 NOTE — PROGRESS NOTES
"Subjective   Patient ID: Michell Francis is a 44 y.o. female who presents for Follow-up chronic medical problems.    Weight management handling semaglutide.  Using compounded pharmacy.  Weight down 36 pounds past months.  Less pain, more active.  Does not have daily HEP.  Workup for autoimmune negative.  Meloxicam 7.5 to 15 mg daily and tylenol for pain.  Advised not to take flexeril since taking new med, auvelity.  Risk of serotinin syndrome.  Meds and labs reviewed.         Review of Systems   Constitutional:  Positive for fatigue.   Respiratory:  Negative for shortness of breath.    Cardiovascular:  Negative for chest pain.   Musculoskeletal:  Positive for arthralgias, neck pain and neck stiffness.        Shoulder pain, history of rotator cuff tear.   Neurological:  Positive for headaches. Negative for dizziness.       Objective   /78 (BP Location: Right arm, Patient Position: Sitting)   Pulse 84   Ht 1.575 m (5' 2\")   Wt 73.9 kg (163 lb)   SpO2 96%   BMI 29.81 kg/m²     Physical Exam  Constitutional:       Appearance: Normal appearance.   Neck:      Vascular: No carotid bruit.   Cardiovascular:      Rate and Rhythm: Normal rate and regular rhythm.      Pulses: Normal pulses.      Heart sounds: Normal heart sounds.   Pulmonary:      Effort: Pulmonary effort is normal.      Breath sounds: Normal breath sounds.   Neurological:      General: No focal deficit present.      Mental Status: She is alert.   Psychiatric:         Mood and Affect: Mood normal.         Behavior: Behavior normal.         Thought Content: Thought content normal.         Judgment: Judgment normal.         Assessment/Plan   Problem List Items Addressed This Visit             ICD-10-CM    Anxiety and depression F41.9, F32.A     Condition improved/trinity with meds noted in emr.  Fu with therapist every 2 weeks, psychiatry every 6-8 weeks.         Fibromyalgia - Primary M79.7     Rec meloxicam and tylenol for pain.  Advised no muscle " relaxers.  Taking klonopin bedtime for anxiety and sleep.  Continue pepcid for GI protection.         Migraine without status migrainosus, not intractable G43.909     Less headaches with nurtec.  Followed with neurology.         Encounter for preventative adult health care examination Z00.00    Relevant Orders    CBC and Auto Differential    Comprehensive metabolic panel    Lipid Panel    TSH with reflex to Free T4 if abnormal    Vitamin D 25-Hydroxy,Total (for eval of Vitamin D levels)    Urinalysis with Reflex Microscopic

## 2024-12-09 NOTE — ASSESSMENT & PLAN NOTE
No Rec meloxicam and tylenol for pain.  Advised no muscle relaxers.  Taking klonopin bedtime for anxiety and sleep.  Continue pepcid for GI protection.

## 2024-12-09 NOTE — ASSESSMENT & PLAN NOTE
Condition improved/trinity with meds noted in emr.  Fu with therapist every 2 weeks, psychiatry every 6-8 weeks.

## 2024-12-18 ENCOUNTER — APPOINTMENT (OUTPATIENT)
Dept: ALLERGY | Facility: CLINIC | Age: 44
End: 2024-12-18
Payer: COMMERCIAL

## 2024-12-31 ENCOUNTER — SPECIALTY PHARMACY (OUTPATIENT)
Dept: PHARMACY | Facility: CLINIC | Age: 44
End: 2024-12-31

## 2024-12-31 PROCEDURE — RXMED WILLOW AMBULATORY MEDICATION CHARGE

## 2025-01-03 ENCOUNTER — PHARMACY VISIT (OUTPATIENT)
Dept: PHARMACY | Facility: CLINIC | Age: 45
End: 2025-01-03
Payer: COMMERCIAL

## 2025-01-08 RX ORDER — DIPHENOXYLATE HYDROCHLORIDE AND ATROPINE SULFATE 2.5; .025 MG/1; MG/1
1 TABLET ORAL 4 TIMES DAILY PRN
Qty: 30 TABLET | Refills: 0 | Status: SHIPPED | OUTPATIENT
Start: 2025-01-08 | End: 2025-02-07

## 2025-01-10 DIAGNOSIS — G43.909 MIGRAINE WITHOUT STATUS MIGRAINOSUS, NOT INTRACTABLE, UNSPECIFIED MIGRAINE TYPE: ICD-10-CM

## 2025-01-15 ENCOUNTER — APPOINTMENT (OUTPATIENT)
Dept: ALLERGY | Facility: CLINIC | Age: 45
End: 2025-01-15
Payer: COMMERCIAL

## 2025-01-15 DIAGNOSIS — J30.2 SEASONAL ALLERGIES: ICD-10-CM

## 2025-01-15 PROCEDURE — 95115 IMMUNOTHERAPY ONE INJECTION: CPT | Performed by: ALLERGY & IMMUNOLOGY

## 2025-02-06 DIAGNOSIS — M79.7 FIBROMYALGIA: ICD-10-CM

## 2025-02-06 DIAGNOSIS — R52 CHRONIC PAIN OF MULTIPLE SITES: ICD-10-CM

## 2025-02-06 DIAGNOSIS — G89.29 CHRONIC PAIN OF MULTIPLE SITES: ICD-10-CM

## 2025-02-07 RX ORDER — MELOXICAM 7.5 MG/1
7.5 TABLET ORAL 2 TIMES DAILY PRN
Qty: 30 TABLET | Refills: 3 | Status: SHIPPED | OUTPATIENT
Start: 2025-02-07

## 2025-02-11 DIAGNOSIS — K58.0 IRRITABLE BOWEL SYNDROME WITH DIARRHEA: ICD-10-CM

## 2025-02-13 ENCOUNTER — APPOINTMENT (OUTPATIENT)
Dept: ALLERGY | Facility: CLINIC | Age: 45
End: 2025-02-13
Payer: COMMERCIAL

## 2025-02-13 DIAGNOSIS — J30.2 SEASONAL ALLERGIES: ICD-10-CM

## 2025-02-13 PROCEDURE — 95115 IMMUNOTHERAPY ONE INJECTION: CPT | Performed by: ALLERGY & IMMUNOLOGY

## 2025-02-13 RX ORDER — ELUXADOLINE 75 MG/1
75 TABLET, FILM COATED ORAL 2 TIMES DAILY
Qty: 180 TABLET | Refills: 3 | Status: SHIPPED | OUTPATIENT
Start: 2025-02-13 | End: 2026-02-13

## 2025-03-13 ENCOUNTER — APPOINTMENT (OUTPATIENT)
Dept: ALLERGY | Facility: CLINIC | Age: 45
End: 2025-03-13
Payer: COMMERCIAL

## 2025-03-27 ENCOUNTER — APPOINTMENT (OUTPATIENT)
Dept: ALLERGY | Facility: CLINIC | Age: 45
End: 2025-03-27
Payer: COMMERCIAL

## 2025-03-27 DIAGNOSIS — J30.2 SEASONAL ALLERGIES: ICD-10-CM

## 2025-03-27 PROCEDURE — 95115 IMMUNOTHERAPY ONE INJECTION: CPT | Performed by: ALLERGY & IMMUNOLOGY

## 2025-04-10 ENCOUNTER — APPOINTMENT (OUTPATIENT)
Dept: ALLERGY | Facility: CLINIC | Age: 45
End: 2025-04-10
Payer: COMMERCIAL

## 2025-04-24 ENCOUNTER — APPOINTMENT (OUTPATIENT)
Dept: ALLERGY | Facility: CLINIC | Age: 45
End: 2025-04-24
Payer: COMMERCIAL

## 2025-05-01 ENCOUNTER — APPOINTMENT (OUTPATIENT)
Dept: ALLERGY | Facility: CLINIC | Age: 45
End: 2025-05-01
Payer: COMMERCIAL

## 2025-05-22 ENCOUNTER — APPOINTMENT (OUTPATIENT)
Dept: ALLERGY | Facility: CLINIC | Age: 45
End: 2025-05-22
Payer: COMMERCIAL

## 2025-05-22 DIAGNOSIS — J30.2 SEASONAL ALLERGIES: ICD-10-CM

## 2025-05-22 PROCEDURE — 95115 IMMUNOTHERAPY ONE INJECTION: CPT | Performed by: ALLERGY & IMMUNOLOGY

## 2025-06-01 DIAGNOSIS — R52 CHRONIC PAIN OF MULTIPLE SITES: ICD-10-CM

## 2025-06-01 DIAGNOSIS — G89.29 CHRONIC PAIN OF MULTIPLE SITES: ICD-10-CM

## 2025-06-01 DIAGNOSIS — M79.7 FIBROMYALGIA: ICD-10-CM

## 2025-06-02 ENCOUNTER — APPOINTMENT (OUTPATIENT)
Dept: PRIMARY CARE | Facility: CLINIC | Age: 45
End: 2025-06-02
Payer: COMMERCIAL

## 2025-06-02 RX ORDER — MELOXICAM 7.5 MG/1
7.5 TABLET ORAL 2 TIMES DAILY PRN
Qty: 30 TABLET | Refills: 3 | Status: SHIPPED | OUTPATIENT
Start: 2025-06-02

## 2025-06-03 LAB
25(OH)D3+25(OH)D2 SERPL-MCNC: 52 NG/ML (ref 30–100)
ALBUMIN SERPL-MCNC: 3.8 G/DL (ref 3.6–5.1)
ALP SERPL-CCNC: 50 U/L (ref 31–125)
ALT SERPL-CCNC: 9 U/L (ref 6–29)
ANION GAP SERPL CALCULATED.4IONS-SCNC: 7 MMOL/L (CALC) (ref 7–17)
APPEARANCE UR: CLEAR
AST SERPL-CCNC: 14 U/L (ref 10–35)
BASOPHILS # BLD AUTO: 49 CELLS/UL (ref 0–200)
BASOPHILS NFR BLD AUTO: 0.6 %
BILIRUB SERPL-MCNC: 0.3 MG/DL (ref 0.2–1.2)
BILIRUB UR QL STRIP: NEGATIVE
BUN SERPL-MCNC: 20 MG/DL (ref 7–25)
CALCIUM SERPL-MCNC: 8.7 MG/DL (ref 8.6–10.2)
CHLORIDE SERPL-SCNC: 104 MMOL/L (ref 98–110)
CHOLEST SERPL-MCNC: 151 MG/DL
CHOLEST/HDLC SERPL: 2.1 (CALC)
CO2 SERPL-SCNC: 27 MMOL/L (ref 20–32)
COLOR UR: YELLOW
CREAT SERPL-MCNC: 0.56 MG/DL (ref 0.5–0.99)
EGFRCR SERPLBLD CKD-EPI 2021: 114 ML/MIN/1.73M2
EOSINOPHIL # BLD AUTO: 320 CELLS/UL (ref 15–500)
EOSINOPHIL NFR BLD AUTO: 3.9 %
ERYTHROCYTE [DISTWIDTH] IN BLOOD BY AUTOMATED COUNT: 13.3 % (ref 11–15)
GLUCOSE SERPL-MCNC: 84 MG/DL (ref 65–99)
GLUCOSE UR QL STRIP: NEGATIVE
HCT VFR BLD AUTO: 41 % (ref 35–45)
HDLC SERPL-MCNC: 71 MG/DL
HGB BLD-MCNC: 12.8 G/DL (ref 11.7–15.5)
HGB UR QL STRIP: NEGATIVE
KETONES UR QL STRIP: NEGATIVE
LDLC SERPL CALC-MCNC: 55 MG/DL (CALC)
LEUKOCYTE ESTERASE UR QL STRIP: NEGATIVE
LYMPHOCYTES # BLD AUTO: 2952 CELLS/UL (ref 850–3900)
LYMPHOCYTES NFR BLD AUTO: 36 %
MCH RBC QN AUTO: 32.9 PG (ref 27–33)
MCHC RBC AUTO-ENTMCNC: 31.2 G/DL (ref 32–36)
MCV RBC AUTO: 105.4 FL (ref 80–100)
MONOCYTES # BLD AUTO: 746 CELLS/UL (ref 200–950)
MONOCYTES NFR BLD AUTO: 9.1 %
NEUTROPHILS # BLD AUTO: 4133 CELLS/UL (ref 1500–7800)
NEUTROPHILS NFR BLD AUTO: 50.4 %
NITRITE UR QL STRIP: NEGATIVE
NONHDLC SERPL-MCNC: 80 MG/DL (CALC)
PH UR STRIP: 6.5 [PH] (ref 5–8)
PLATELET # BLD AUTO: 242 THOUSAND/UL (ref 140–400)
PMV BLD REES-ECKER: 8.5 FL (ref 7.5–12.5)
POTASSIUM SERPL-SCNC: 4.5 MMOL/L (ref 3.5–5.3)
PROT SERPL-MCNC: 6.2 G/DL (ref 6.1–8.1)
PROT UR QL STRIP: NEGATIVE
RBC # BLD AUTO: 3.89 MILLION/UL (ref 3.8–5.1)
SODIUM SERPL-SCNC: 138 MMOL/L (ref 135–146)
SP GR UR STRIP: 1.01 (ref 1–1.03)
TRIGL SERPL-MCNC: 186 MG/DL
TSH SERPL-ACNC: 3.02 MIU/L
WBC # BLD AUTO: 8.2 THOUSAND/UL (ref 3.8–10.8)

## 2025-06-09 ENCOUNTER — APPOINTMENT (OUTPATIENT)
Dept: PRIMARY CARE | Facility: CLINIC | Age: 45
End: 2025-06-09
Payer: COMMERCIAL

## 2025-06-18 ENCOUNTER — APPOINTMENT (OUTPATIENT)
Dept: ALLERGY | Facility: CLINIC | Age: 45
End: 2025-06-18
Payer: COMMERCIAL

## 2025-06-18 DIAGNOSIS — J30.2 SEASONAL ALLERGIES: ICD-10-CM

## 2025-06-18 PROCEDURE — 95115 IMMUNOTHERAPY ONE INJECTION: CPT | Performed by: ALLERGY & IMMUNOLOGY

## 2025-07-08 ENCOUNTER — APPOINTMENT (OUTPATIENT)
Dept: NEUROLOGY | Facility: CLINIC | Age: 45
End: 2025-07-08
Payer: COMMERCIAL

## 2025-07-08 ENCOUNTER — SPECIALTY PHARMACY (OUTPATIENT)
Dept: PHARMACY | Facility: CLINIC | Age: 45
End: 2025-07-08

## 2025-07-08 VITALS
SYSTOLIC BLOOD PRESSURE: 110 MMHG | HEART RATE: 76 BPM | DIASTOLIC BLOOD PRESSURE: 76 MMHG | TEMPERATURE: 96 F | HEIGHT: 63 IN | RESPIRATION RATE: 17 BRPM | BODY MASS INDEX: 26.58 KG/M2 | WEIGHT: 150 LBS

## 2025-07-08 DIAGNOSIS — G43.909 MIGRAINE WITHOUT STATUS MIGRAINOSUS, NOT INTRACTABLE, UNSPECIFIED MIGRAINE TYPE: Primary | ICD-10-CM

## 2025-07-08 PROCEDURE — 99214 OFFICE O/P EST MOD 30 MIN: CPT | Performed by: NURSE PRACTITIONER

## 2025-07-08 PROCEDURE — 3008F BODY MASS INDEX DOCD: CPT | Performed by: NURSE PRACTITIONER

## 2025-07-08 RX ORDER — CETIRIZINE HYDROCHLORIDE 10 MG/1
TABLET, CHEWABLE ORAL DAILY
COMMUNITY

## 2025-07-08 RX ORDER — KETAMINE HCL IN 0.9 % NACL 30 MG/50ML
PLASTIC BAG, INJECTION (ML) INTRAVENOUS
COMMUNITY

## 2025-07-08 RX ORDER — OMEPRAZOLE 20 MG/1
20 TABLET, DELAYED RELEASE ORAL
COMMUNITY

## 2025-07-08 ASSESSMENT — PATIENT HEALTH QUESTIONNAIRE - PHQ9
9. THOUGHTS THAT YOU WOULD BE BETTER OFF DEAD, OR OF HURTING YOURSELF: NOT AT ALL
6. FEELING BAD ABOUT YOURSELF - OR THAT YOU ARE A FAILURE OR HAVE LET YOURSELF OR YOUR FAMILY DOWN: NEARLY EVERY DAY
SUM OF ALL RESPONSES TO PHQ QUESTIONS 1-9: 16
SUM OF ALL RESPONSES TO PHQ9 QUESTIONS 1 AND 2: 3
1. LITTLE INTEREST OR PLEASURE IN DOING THINGS: SEVERAL DAYS
7. TROUBLE CONCENTRATING ON THINGS, SUCH AS READING THE NEWSPAPER OR WATCHING TELEVISION: NEARLY EVERY DAY
5. POOR APPETITE OR OVEREATING: MORE THAN HALF THE DAYS
4. FEELING TIRED OR HAVING LITTLE ENERGY: NEARLY EVERY DAY
8. MOVING OR SPEAKING SO SLOWLY THAT OTHER PEOPLE COULD HAVE NOTICED. OR THE OPPOSITE, BEING SO FIGETY OR RESTLESS THAT YOU HAVE BEEN MOVING AROUND A LOT MORE THAN USUAL: NOT AT ALL
2. FEELING DOWN, DEPRESSED OR HOPELESS: MORE THAN HALF THE DAYS
3. TROUBLE FALLING OR STAYING ASLEEP OR SLEEPING TOO MUCH: MORE THAN HALF THE DAYS

## 2025-07-08 ASSESSMENT — PAIN SCALES - GENERAL: PAINLEVEL_OUTOF10: 0-NO PAIN

## 2025-07-08 NOTE — PROGRESS NOTES
"Chief Complaint   Patient presents with    Follow-up     Patient reports nurtec is going well!       Subjective   HPI  Michell Francis is a 44 y.o. year old female who presents with chief complaint Migraine without status migrainosus, not intractable, unspecified migraine type [G43.909]    Michell is experiencing  3 HA days per month, 3 of the HAs meet migraine criteria.   The headaches are usually severe and sharp and throbbing and are located behind eyes, generally symmetric.   Generally, headaches last about 1-2 hours in duration.  With Nurtec  Associated photophobia and phonophobia  The headaches are not positional.   Headaches are worsened with exertion. No change in character with sneezing, coughing and bending over.   The current rescue medication Nurtec starts to take effect within 1 -2   hours and decreases the headache by 100%. The patient does not typically repeat treatment with rescue medication.   Work attendance or other daily activities affected:  pushes through it as Nurtec causes \"whooziness\"  Caffeine:  1 cup of coffee in AM  Sleep:  7-8 hours per night  The patient denies the presence of any associated double vision, speech problems, confusion, facial or extremity weakness or numbness or problems with coordination. Denies other neurological history of seizures, stroke, or TIA.        Current/ past HA treatments:  Preventive:  SSRIs contraindicated related to mental health status of bipolar  Gabapentin  Escitalopram    Rescue:  Acetaminophen  Ibuprofen  Cyclobenzaprine  Meloxicam  Sumatriptan  Naratriptan    Current Medications[1]    RX Allergies[2]    Medical History[3]  Surgical History[4]  Family History[5]  Social History     Tobacco Use    Smoking status: Former     Current packs/day: 0.00     Types: Cigarettes     Quit date: 1996     Years since quittin.5    Smokeless tobacco: Never   Substance Use Topics    Alcohol use: Yes     Alcohol/week: 4.0 standard drinks of alcohol     Types: 4 " "Standard drinks or equivalent per week     Social History     Substance and Sexual Activity   Drug Use Never        ROS  As noted in HPI, otherwise all other systems have been reviewed are negative for complaint.     Objective   General Appearance: Michell is well-developed, well-nourished, 44 y.o. year old female, in no acute distress. Makes good eye contact, is alert, interactive, and cooperative. Demonstrates recent & remote memory recall. Subjective information consistent with objective assessment.     Vitals:    07/08/25 0932   BP: 110/76   Pulse: 76   Resp: 17   Temp: 35.6 °C (96 °F)   TempSrc: Temporal   Weight: 68 kg (150 lb)   Height: 1.6 m (5' 3\")   PainSc: 0-No pain       Lab Results   Component Value Date    WBC 8.2 06/02/2025    RBC 3.89 06/02/2025    HGB 12.8 06/02/2025    HCT 41.0 06/02/2025     06/02/2025     06/02/2025    K 4.5 06/02/2025     06/02/2025    BUN 20 06/02/2025    CREATININE 0.56 06/02/2025    EGFR 114 06/02/2025    CALCIUM 8.7 06/02/2025    ALKPHOS 50 06/02/2025    AST 14 06/02/2025    ALT 9 06/02/2025    MG 1.98 12/30/2022    MRWREBRZ82 594 08/26/2022    VITD25 52 06/02/2025    HGBA1C 4.8 03/16/2020    LDLCALC 55 06/02/2025    CHOL 151 06/02/2025    HDL 71 06/02/2025    TRIG 186 (H) 06/02/2025    TSH 3.02 06/02/2025        Mental Status  Patient Health Questionnaire-2 Score: 3       Neurological Exam  Cranial Nerves  CN III, IV, VI: Extraocular movements intact bilaterally. Normal lids and orbits bilaterally.  CN VII: Full and symmetric facial movement.  CN VIII: Hearing is normal.    RECORDS REVIEW:  Previous records (provider notes, laboratory reports, and imaging studies were reviewed and summarized).  Labs reviewed.  Previous neuronote reviewed.    Assessment & Plan  Migraine without status migrainosus, not intractable, unspecified migraine type    Orders:    rimegepant (NURTEC) 75 mg tablet,disintegrating; Place 1 tablet (75 mg) under the tongue if needed " (migraine). May repeat in 48 hours if needed.         ASSESSMENT/PLAN:  Continue Nurtec 75 mg for abortive treatment of migraine.  Follow-up in 1 year or sooner if needed.        Na Le, APRN-CNP           [1]   Current Outpatient Medications:     acetaminophen (Tylenol) 500 mg tablet, Take 1-2 tablets (500-1,000 mg) by mouth 2 times a day., Disp: , Rfl:     bimatoprost (Latisse) 0.03 % ophthalmic solution, APPLY WITH APPLICATIOR TO UPPER EYLASHES ONCE DAILY, Disp: , Rfl:     cetirizine (ZyrTEC) 10 mg chewable tablet, Chew and swallow once daily., Disp: , Rfl:     cholecalciferol (Vitamin D-3) 50 MCG (2000 UT) tablet, Take by mouth., Disp: , Rfl:     clobetasol (Temovate) 0.05 % ointment, APPLY TO AFFECTED AREA TWICE A DAY FOR A MONTH THEN DAILY FOR A MONTH THEN TWICE WEEKLY FOR A MONTH, Disp: , Rfl:     clonazePAM (KlonoPIN) 0.5 mg tablet, clonazePAM 0.5 MG Oral Tablet  Refills: 0      Start : 16-Sep-2021  Active, Disp: , Rfl:     cyanocobalamin, vitamin B-12, (Vitamin B-12) 1,000 mcg tablet extended release, Take by mouth., Disp: , Rfl:     dextromethorphan-bupropion (Auvelity)  mg tablet, IR and ER, biphasic, Take by mouth 2 times a day., Disp: , Rfl:     drospirenone-ethinyl estradioL (Shefali, Ocella) 3-0.03 mg tablet, Take by mouth., Disp: , Rfl:     eluxadoline (Viberzi) 75 mg tablet, Take 1 tablet (75 mg) by mouth 2 times a day., Disp: 180 tablet, Rfl: 3    ketamine HCl in 0.9 % NaCl (ketamine in 0.9 % sod chloride) 0.6 mg/mL solution, Infuse into a venous catheter., Disp: , Rfl:     lifitegrast (Xiidra) 5 % dropperette, Administer into affected eye(s)., Disp: , Rfl:     meloxicam (Mobic) 7.5 mg tablet, TAKE 1 TABLET (7.5 MG) BY MOUTH 2 TIMES A DAY AS NEEDED FOR MODERATE PAIN (4 - 6)., Disp: 30 tablet, Rfl: 3    omeprazole OTC (PriLOSEC OTC) 20 mg EC tablet, Take 1 tablet (20 mg) by mouth once daily in the morning. Take before meals. Do not crush, chew, or split., Disp: , Rfl:     semaglutide  0.25 mg or 0.5 mg (2 mg/3 mL) pen injector, Inject 0.5 mg under the skin every 7 days., Disp: , Rfl:     tacrolimus (Protopic) 0.1 % ointment, Apply topically., Disp: , Rfl:     famotidine (Pepcid) 20 mg tablet, Take 1 tablet (20 mg) by mouth once daily., Disp: , Rfl:     fexofenadine (Allegra) 180 mg tablet, Take 1 tablet (180 mg) by mouth once daily., Disp: , Rfl:     lamoTRIgine (LaMICtal) 100 mg tablet, Take 0.5 tablets (50 mg) by mouth once daily., Disp: 45 tablet, Rfl: 3    rimegepant (NURTEC) 75 mg tablet,disintegrating, Place 1 tablet (75 mg) under the tongue if needed (migraine). May repeat in 48 hours if needed., Disp: 16 tablet, Rfl: 3  [2]   Allergies  Allergen Reactions    Animal Dander Unknown    Cat Dander Unknown   [3]   Past Medical History:  Diagnosis Date    Abnormal weight gain 03/31/2017    Abnormal weight gain    Abnormal weight loss 08/06/2014    Weight loss    Acute candidiasis of vulva and vagina 10/19/2015    Yeast infection of the vagina    Acute recurrent maxillary sinusitis 04/15/2021    Acute recurrent maxillary sinusitis    Acute sinusitis, unspecified 01/11/2018    Acute sinusitis with symptoms > 10 days    Acute upper respiratory infection, unspecified 11/16/2020    Acute upper respiratory infection    ADHD (attention deficit hyperactivity disorder)     Allergic     Anxiety     Arthritis     Body mass index (BMI) 35.0-35.9, adult 08/02/2022    BMI 35.0-35.9,adult    Cervicalgia 12/05/2014    Neck pain    Depression     Disease of thyroid gland     Disorder of external ear, unspecified, unspecified ear 04/17/2015    External ear disorder    Dry eye syndrome of bilateral lacrimal glands     Dry eyes    Fever, unspecified 04/24/2020    Low grade fever    Fibromyalgia 10/31/2017    Fibromyalgia    Fibromyalgia, primary     GERD (gastroesophageal reflux disease)     Headache, unspecified 09/20/2017    Frontal headache    Insomnia     Memory loss     Mental disorder, not otherwise  specified     Psychiatric disorder    Migraine     Nasal congestion 10/12/2015    Sinus congestion    Noninfective gastroenteritis and colitis, unspecified 12/04/2015    Chronic diarrhea of unknown origin    Other chest pain 04/24/2020    Chest tightness    Other conditions influencing health status 04/24/2020    History of cough    Other conditions influencing health status 03/31/2017    Sweating    Other injury of unspecified body region, initial encounter 11/03/2020    Bruising    Other specified disorders of eustachian tube, bilateral 04/15/2021    Dysfunction of both eustachian tubes    Peripheral neuropathy     Personal history of diseases of the skin and subcutaneous tissue 06/08/2018    History of skin pruritus    Personal history of other diseases of the digestive system 02/23/2018    History of stomatitis    Personal history of other diseases of the digestive system 01/20/2014    History of irritable bowel syndrome    Personal history of other diseases of the musculoskeletal system and connective tissue 04/30/2015    History of backache    Personal history of other diseases of the musculoskeletal system and connective tissue 01/11/2018    History of joint pain    Personal history of other diseases of the nervous system and sense organs 04/17/2015    History of impacted cerumen    Personal history of other diseases of the respiratory system 10/10/2017    History of acute sinusitis    Personal history of other diseases of the respiratory system 10/12/2015    History of acute bronchitis    Personal history of other diseases of the respiratory system 02/19/2020    History of pharyngitis    Personal history of other infectious and parasitic diseases 11/21/2019    History of dermatophytosis    Personal history of other infectious and parasitic diseases 04/10/2018    History of recurrent infection    Personal history of other mental and behavioral disorders 10/31/2017    History of depression    Personal history  of other mental and behavioral disorders     History of anxiety disorder    Personal history of other specified conditions 08/06/2014    History of diarrhea    Personal history of other specified conditions 08/06/2014    History of dizziness    Personal history of other specified conditions 03/26/2018    History of hoarseness    Personal history of other specified conditions 01/11/2018    History of insomnia    Personal history of other specified conditions 12/31/2019    History of nasal congestion    Personal history of other specified conditions 11/03/2020    History of itching    Personal history of other specified conditions 09/20/2017    History of facial pain    Personal history of other specified conditions 10/15/2014    History of fatigue    Unspecified lump in the left breast, unspecified quadrant 11/24/2015    Breast mass, left    Varicella     Visual impairment    [4]   Past Surgical History:  Procedure Laterality Date    ANTERIOR CERVICAL DISCECTOMY W/ FUSION  2004, 2016, 2017    BREAST SURGERY  10/31/2017    Breast Surgery Reduction Procedure    CERVICAL DISCECTOMY  06/2019    CERVICAL FUSION  6/2019    COLONOSCOPY  12/13/2017    Complete Colonoscopy    COSMETIC SURGERY      NECK SURGERY  03/12/2020    Neck Surgery    OTHER SURGICAL HISTORY  10/31/2017    Abdominal Surgery    OTHER SURGICAL HISTORY  06/24/2022    Esophageal manometry    OTHER SURGICAL HISTORY  06/22/2022    Esophagogastroduodenoscopy    POSTERIOR CERVICAL LAMINECTOMY  06/2019    SINUS SURGERY  10/31/2017    Sinus Surgery    SPINE SURGERY  4 surgeries, C3-T1 fused    WISDOM TOOTH EXTRACTION     [5]   Family History  Problem Relation Name Age of Onset    Other (heart problems) Mother      Pancreatitis Father      Epilepsy Sister      Alzheimer's disease Paternal Grandfather      Diabetes Other Grandfather     Other (cardiac disorder) Other Grandfather     Arthritis Paternal Grandmother Susi Herrera     Blood clot Father Tenzin Herrera      Hypertension Father Tenzin Herrera     Cancer Paternal Grandfather Alexy Sathish     Depression Paternal Grandfather Alexy Bower     Diabetes Paternal Grandfather Alexymacie Bower     Heart disease Paternal Grandfather Alexymacie Bower     Depression Mother's Brother Isreal Campuzanoig     Genetic Testing Mother's Brother Isreal Campuzanoig     Hernia Mother Myah Herrera     Hypertension Mother Myah Herrera     Hernia Maternal Grandmother Migdalia Sathish     Stroke Father Tenzin Herrera 70 - 79    Dementia Paternal Grandfather Alexy Bower     Arthritis Paternal Grandmother Susifabián Herrera     Depression Mother's Brother Isreal Campuzanoig     Genetic Testing Mother's Brother Isreal Sathish     Hernia Maternal Grandmother Migdalia Bower     Arthritis Paternal Grandmother Susi Javier     Genetic Testing Mother's Brother Isreal Sathish     Hernia Maternal Grandmother Migdalia Bower     Depression Maternal Grandfather Alexy Bower     Seizures Sister Eliza Espinalsammie

## 2025-07-08 NOTE — ASSESSMENT & PLAN NOTE
Orders:    rimegepant (NURTEC) 75 mg tablet,disintegrating; Place 1 tablet (75 mg) under the tongue if needed (migraine). May repeat in 48 hours if needed.

## 2025-07-09 PROCEDURE — RXMED WILLOW AMBULATORY MEDICATION CHARGE

## 2025-07-14 ENCOUNTER — PHARMACY VISIT (OUTPATIENT)
Dept: PHARMACY | Facility: CLINIC | Age: 45
End: 2025-07-14
Payer: MEDICARE

## 2025-07-16 ENCOUNTER — APPOINTMENT (OUTPATIENT)
Dept: ALLERGY | Facility: CLINIC | Age: 45
End: 2025-07-16
Payer: COMMERCIAL

## 2025-07-16 DIAGNOSIS — J30.2 SEASONAL ALLERGIES: ICD-10-CM

## 2025-07-16 PROCEDURE — 95115 IMMUNOTHERAPY ONE INJECTION: CPT | Performed by: ALLERGY & IMMUNOLOGY

## 2025-07-31 ENCOUNTER — APPOINTMENT (OUTPATIENT)
Dept: PRIMARY CARE | Facility: CLINIC | Age: 45
End: 2025-07-31
Payer: COMMERCIAL

## 2025-07-31 VITALS
OXYGEN SATURATION: 99 % | HEART RATE: 85 BPM | DIASTOLIC BLOOD PRESSURE: 70 MMHG | SYSTOLIC BLOOD PRESSURE: 122 MMHG | HEIGHT: 63 IN | WEIGHT: 150 LBS | BODY MASS INDEX: 26.58 KG/M2

## 2025-07-31 DIAGNOSIS — F33.2 MAJOR DEPRESSIVE DISORDER, RECURRENT SEVERE WITHOUT PSYCHOTIC FEATURES (MULTI): ICD-10-CM

## 2025-07-31 DIAGNOSIS — Z00.00 ENCOUNTER FOR PREVENTATIVE ADULT HEALTH CARE EXAMINATION: Primary | ICD-10-CM

## 2025-07-31 DIAGNOSIS — M79.7 FIBROMYALGIA: ICD-10-CM

## 2025-07-31 DIAGNOSIS — G89.29 CHRONIC PAIN OF MULTIPLE SITES: ICD-10-CM

## 2025-07-31 DIAGNOSIS — R52 CHRONIC PAIN OF MULTIPLE SITES: ICD-10-CM

## 2025-07-31 DIAGNOSIS — D75.89 MACROCYTOSIS: ICD-10-CM

## 2025-07-31 PROBLEM — F31.9 BIPOLAR DISORDER WITH DEPRESSION (MULTI): Status: RESOLVED | Noted: 2023-01-25 | Resolved: 2025-07-31

## 2025-07-31 PROCEDURE — 99396 PREV VISIT EST AGE 40-64: CPT | Performed by: INTERNAL MEDICINE

## 2025-07-31 PROCEDURE — 3008F BODY MASS INDEX DOCD: CPT | Performed by: INTERNAL MEDICINE

## 2025-07-31 RX ORDER — MELOXICAM 7.5 MG/1
7.5 TABLET ORAL 2 TIMES DAILY PRN
Qty: 180 TABLET | Refills: 1 | Status: SHIPPED | OUTPATIENT
Start: 2025-07-31

## 2025-07-31 ASSESSMENT — ENCOUNTER SYMPTOMS
SLEEP DISTURBANCE: 1
MYALGIAS: 1
CARDIOVASCULAR NEGATIVE: 1
ARTHRALGIAS: 1
RESPIRATORY NEGATIVE: 1
ENDOCRINE NEGATIVE: 1
ALLERGIC/IMMUNOLOGIC NEGATIVE: 1
EYES NEGATIVE: 1
NEUROLOGICAL NEGATIVE: 1
GASTROINTESTINAL NEGATIVE: 1
CONSTITUTIONAL NEGATIVE: 1
HEMATOLOGIC/LYMPHATIC NEGATIVE: 1
BACK PAIN: 1

## 2025-07-31 ASSESSMENT — PATIENT HEALTH QUESTIONNAIRE - PHQ9
1. LITTLE INTEREST OR PLEASURE IN DOING THINGS: NOT AT ALL
2. FEELING DOWN, DEPRESSED OR HOPELESS: NOT AT ALL
SUM OF ALL RESPONSES TO PHQ9 QUESTIONS 1 AND 2: 0

## 2025-07-31 NOTE — PROGRESS NOTES
"Subjective   Patient ID: Michell Francis is a 44 y.o. female who presents for Annual Exam.    Well Adult Physical   Patient here for a comprehensive physical exam.  The patient reports problems - \"hard year\".  Following with psych for major depressive disorder.  Wellness clinic handling ketamine injections monthly.  No side effects noted.  Taking meloxicam for body pain.  Recent MRI, chiropractor and ortho, showed L spine DDD with disc herniation left L5-S1.  Referred to PM.  Not interested in surgery.  Weight down 35 pounds with semaglutide.  Handled with wellness clinic.  BMI 26.  Meds and labs reviewed.    Do you take any herbs or supplements that were not prescribed by a doctor? yes   Are you taking calcium supplements? no   Are you taking aspirin daily? no    Gyn: UTD    Mammogram: 7-2025    Colonoscopy: 3-2023         Review of Systems   Constitutional: Negative.    HENT: Negative.     Eyes: Negative.    Respiratory: Negative.     Cardiovascular: Negative.    Gastrointestinal: Negative.    Endocrine: Negative.    Genitourinary: Negative.    Musculoskeletal:  Positive for arthralgias, back pain and myalgias.   Skin: Negative.    Allergic/Immunologic: Negative.    Neurological: Negative.    Hematological: Negative.    Psychiatric/Behavioral:  Positive for sleep disturbance. Negative for suicidal ideas.        Objective   /70   Pulse 85   Ht 1.6 m (5' 3\")   Wt 68 kg (150 lb)   SpO2 99%   BMI 26.57 kg/m²     Physical Exam  Vitals and nursing note reviewed.   Constitutional:       Appearance: Normal appearance.   HENT:      Head: Normocephalic and atraumatic.      Right Ear: Tympanic membrane normal.      Left Ear: Tympanic membrane normal.      Nose: Nose normal.      Mouth/Throat:      Pharynx: Oropharynx is clear.     Eyes:      Extraocular Movements: Extraocular movements intact.      Conjunctiva/sclera: Conjunctivae normal.      Pupils: Pupils are equal, round, and reactive to light. "       Cardiovascular:      Rate and Rhythm: Normal rate and regular rhythm.      Pulses: Normal pulses.      Heart sounds: Normal heart sounds.   Pulmonary:      Effort: Pulmonary effort is normal.      Breath sounds: Normal breath sounds.   Abdominal:      General: Bowel sounds are normal.      Palpations: Abdomen is soft.     Musculoskeletal:         General: Normal range of motion.      Cervical back: Normal range of motion and neck supple.     Skin:     General: Skin is warm and dry.     Neurological:      General: No focal deficit present.      Mental Status: She is alert and oriented to person, place, and time. Mental status is at baseline.     Psychiatric:         Mood and Affect: Mood normal.         Behavior: Behavior normal.         Thought Content: Thought content normal.         Judgment: Judgment normal.         Assessment/Plan     Adult Health Maintenance  Labs reviewed.  Discussed NSAID surveillance.  Fu with psych, PM and wellness clinic as directed.  Recommend diet, exercise and weight management.   Fu 6 months.    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

## 2025-08-13 ENCOUNTER — APPOINTMENT (OUTPATIENT)
Dept: ALLERGY | Facility: CLINIC | Age: 45
End: 2025-08-13
Payer: COMMERCIAL

## 2025-08-13 DIAGNOSIS — J30.2 SEASONAL ALLERGIES: ICD-10-CM

## 2025-08-13 PROCEDURE — 95115 IMMUNOTHERAPY ONE INJECTION: CPT | Performed by: ALLERGY & IMMUNOLOGY

## 2025-09-10 ENCOUNTER — APPOINTMENT (OUTPATIENT)
Dept: ALLERGY | Facility: CLINIC | Age: 45
End: 2025-09-10
Payer: COMMERCIAL

## 2025-10-08 ENCOUNTER — APPOINTMENT (OUTPATIENT)
Dept: ALLERGY | Facility: CLINIC | Age: 45
End: 2025-10-08
Payer: COMMERCIAL

## 2026-02-04 ENCOUNTER — APPOINTMENT (OUTPATIENT)
Dept: PRIMARY CARE | Facility: CLINIC | Age: 46
End: 2026-02-04
Payer: COMMERCIAL

## 2026-07-07 ENCOUNTER — APPOINTMENT (OUTPATIENT)
Dept: NEUROLOGY | Facility: CLINIC | Age: 46
End: 2026-07-07
Payer: COMMERCIAL